# Patient Record
Sex: MALE | Race: WHITE | Employment: PART TIME | URBAN - METROPOLITAN AREA
[De-identification: names, ages, dates, MRNs, and addresses within clinical notes are randomized per-mention and may not be internally consistent; named-entity substitution may affect disease eponyms.]

---

## 2019-05-12 ENCOUNTER — APPOINTMENT (OUTPATIENT)
Dept: GENERAL RADIOLOGY | Age: 29
End: 2019-05-12

## 2019-05-12 ENCOUNTER — HOSPITAL ENCOUNTER (EMERGENCY)
Age: 29
Discharge: HOME | End: 2019-05-12
Attending: EMERGENCY MEDICINE

## 2019-05-12 VITALS
RESPIRATION RATE: 16 BRPM | WEIGHT: 175 LBS | BODY MASS INDEX: 26.52 KG/M2 | HEIGHT: 68 IN | TEMPERATURE: 97.7 F | HEART RATE: 52 BPM | SYSTOLIC BLOOD PRESSURE: 145 MMHG | DIASTOLIC BLOOD PRESSURE: 69 MMHG | OXYGEN SATURATION: 100 %

## 2019-05-12 DIAGNOSIS — Z98.890 STATUS POST REDUCTION MAMMOPLASTY: ICD-10-CM

## 2019-05-12 DIAGNOSIS — S92.901A: Primary | ICD-10-CM

## 2019-05-12 DIAGNOSIS — S93.601A FOOT SPRAIN, RIGHT, INITIAL ENCOUNTER: ICD-10-CM

## 2019-05-12 PROCEDURE — 6360000002 HC RX W HCPCS: Performed by: EMERGENCY MEDICINE

## 2019-05-12 PROCEDURE — 73630 X-RAY EXAM OF FOOT: CPT

## 2019-05-12 PROCEDURE — 99284 EMERGENCY DEPT VISIT MOD MDM: CPT

## 2019-05-12 PROCEDURE — 2500000003 HC RX 250 WO HCPCS: Performed by: EMERGENCY MEDICINE

## 2019-05-12 PROCEDURE — 73610 X-RAY EXAM OF ANKLE: CPT

## 2019-05-12 PROCEDURE — 96374 THER/PROPH/DIAG INJ IV PUSH: CPT

## 2019-05-12 RX ORDER — HYDROCODONE BITARTRATE AND ACETAMINOPHEN 5; 325 MG/1; MG/1
1 TABLET ORAL EVERY 6 HOURS PRN
Qty: 12 TABLET | Refills: 0 | Status: SHIPPED | OUTPATIENT
Start: 2019-05-12 | End: 2019-05-15

## 2019-05-12 RX ORDER — SODIUM CHLORIDE 0.9 % (FLUSH) 0.9 %
10 SYRINGE (ML) INJECTION PRN
Status: DISCONTINUED | OUTPATIENT
Start: 2019-05-12 | End: 2019-05-12 | Stop reason: HOSPADM

## 2019-05-12 RX ORDER — NAPROXEN 500 MG/1
500 TABLET ORAL 2 TIMES DAILY
Qty: 14 TABLET | Refills: 0 | Status: SHIPPED | OUTPATIENT
Start: 2019-05-12 | End: 2019-12-20 | Stop reason: ALTCHOICE

## 2019-05-12 RX ORDER — FENTANYL CITRATE 50 UG/ML
25 INJECTION, SOLUTION INTRAMUSCULAR; INTRAVENOUS ONCE
Status: COMPLETED | OUTPATIENT
Start: 2019-05-12 | End: 2019-05-12

## 2019-05-12 RX ORDER — KETOROLAC TROMETHAMINE 30 MG/ML
30 INJECTION, SOLUTION INTRAMUSCULAR; INTRAVENOUS ONCE
Status: DISCONTINUED | OUTPATIENT
Start: 2019-05-12 | End: 2019-05-12

## 2019-05-12 RX ORDER — KETOROLAC TROMETHAMINE 30 MG/ML
15 INJECTION, SOLUTION INTRAMUSCULAR; INTRAVENOUS ONCE
Status: COMPLETED | OUTPATIENT
Start: 2019-05-12 | End: 2019-05-12

## 2019-05-12 RX ORDER — KETAMINE HYDROCHLORIDE 10 MG/ML
1 INJECTION, SOLUTION INTRAMUSCULAR; INTRAVENOUS ONCE
Status: COMPLETED | OUTPATIENT
Start: 2019-05-12 | End: 2019-05-12

## 2019-05-12 RX ADMIN — FENTANYL CITRATE 25 MCG: 50 INJECTION, SOLUTION INTRAMUSCULAR; INTRAVENOUS at 14:25

## 2019-05-12 RX ADMIN — KETOROLAC TROMETHAMINE 15 MG: 30 INJECTION, SOLUTION INTRAMUSCULAR at 13:49

## 2019-05-12 RX ADMIN — KETAMINE HYDROCHLORIDE 79.4 MG: 10 INJECTION, SOLUTION INTRAMUSCULAR; INTRAVENOUS at 14:26

## 2019-05-12 ASSESSMENT — PAIN - FUNCTIONAL ASSESSMENT: PAIN_FUNCTIONAL_ASSESSMENT: PREVENTS OR INTERFERES SOME ACTIVE ACTIVITIES AND ADLS

## 2019-05-12 ASSESSMENT — PAIN SCALES - GENERAL
PAINLEVEL_OUTOF10: 2
PAINLEVEL_OUTOF10: 6

## 2019-05-12 ASSESSMENT — PAIN DESCRIPTION - PAIN TYPE: TYPE: ACUTE PAIN

## 2019-05-12 ASSESSMENT — ENCOUNTER SYMPTOMS
VOMITING: 0
CONSTIPATION: 0
SHORTNESS OF BREATH: 0
BACK PAIN: 0
WHEEZING: 0
ABDOMINAL PAIN: 0
COUGH: 0
BLOOD IN STOOL: 0
NAUSEA: 0
DIARRHEA: 0

## 2019-05-12 ASSESSMENT — PAIN DESCRIPTION - ONSET: ONSET: ON-GOING

## 2019-05-12 ASSESSMENT — PAIN DESCRIPTION - DESCRIPTORS: DESCRIPTORS: THROBBING;SHARP;NUMBNESS

## 2019-05-12 ASSESSMENT — PAIN DESCRIPTION - FREQUENCY: FREQUENCY: INTERMITTENT

## 2019-05-12 ASSESSMENT — PAIN DESCRIPTION - PROGRESSION: CLINICAL_PROGRESSION: GRADUALLY WORSENING

## 2019-05-12 ASSESSMENT — PAIN DESCRIPTION - ORIENTATION: ORIENTATION: RIGHT

## 2019-05-12 ASSESSMENT — PAIN DESCRIPTION - LOCATION: LOCATION: ANKLE

## 2019-05-12 NOTE — ED PROVIDER NOTES
41-year-old male presents to the ED with chief complaint of right ankle pain after falling off a horse about 10 minutes prior to arrival. States he is in severe pain and cannot put pressure on it. Says it hurts even to touch and cannot move it due to severe pain. Ankle Problem   Location:  Ankle and foot  Time since incident:  10 minutes  Injury: yes    Mechanism of injury: fall    Fall:     Fall occurred: off horse. Impact surface:  Dirt    Point of impact:  Feet    Entrapped after fall: no    Ankle location:  R ankle  Foot location:  R foot  Pain details:     Quality:  Aching    Radiates to:  Does not radiate    Severity:  Severe    Onset quality:  Sudden    Timing:  Constant    Progression:  Unchanged  Chronicity:  New  Dislocation: yes    Foreign body present:  No foreign bodies  Tetanus status:  Unknown  Prior injury to area:  Unable to specify  Relieved by:  Nothing  Worsened by:  Bearing weight, flexion, rotation, extension, activity, abduction and adduction  Ineffective treatments:  None tried  Associated symptoms: decreased ROM, stiffness and swelling    Associated symptoms: no back pain, no fatigue, no fever, no itching, no muscle weakness, no neck pain, no numbness and no tingling    Risk factors: no concern for non-accidental trauma, no frequent fractures, no known bone disorder and no recent illness        Review of Systems   Constitutional: Negative for chills, fatigue and fever. Respiratory: Negative for cough, shortness of breath and wheezing. Cardiovascular: Negative for chest pain. Gastrointestinal: Negative for abdominal pain, blood in stool, constipation, diarrhea, nausea and vomiting. Genitourinary: Negative for dysuria and frequency. Musculoskeletal: Positive for stiffness. Negative for back pain and neck pain. Right foot and ankle pain   Skin: Negative for itching, rash and wound.    Neurological: Negative for dizziness, weakness, light-headedness, numbness and headaches. All other systems reviewed and are negative. Physical Exam   Constitutional: He appears well-developed and well-nourished. No distress. HENT:   Head: Normocephalic and atraumatic. Eyes: Pupils are equal, round, and reactive to light. Neck: Normal range of motion. Neck supple. Cardiovascular: Normal rate, regular rhythm, normal heart sounds and intact distal pulses. No murmur heard. Pulmonary/Chest: Effort normal and breath sounds normal. No respiratory distress. He has no wheezes. He has no rales. Abdominal: Soft. Bowel sounds are normal. There is no tenderness. There is no rebound and no guarding. Musculoskeletal: He exhibits tenderness (r ankle TTP, can not move due to severe pain, 2+ distal pulses, sensation intact, cap refill <2s) and deformity. He exhibits no edema. Neurological: He is alert. Skin: Skin is warm and dry. Capillary refill takes less than 2 seconds. He is not diaphoretic. Nursing note and vitals reviewed. Procedures  Conscious Sedation Procedure Note    Indication: dislocation reduction    Consent: I have discussed with the patient and/or the patient representative the indication, alternatives, and the possible risks and/or complications of the planned procedure and the anesthesia methods. The patient and/or patient representative appear to understand and agree to proceed. Physician Involvement: The attending physician was present and supervising this procedure. Pre-Sedation Documentation and Exam:  Time: 8639  I have personally completed a history, physical exam & review of systems for this patient (see notes). Vital signs have been reviewed (see flow sheet for vitals). I have reviewed the patient's history and review of systems.     Airway Assessment: Mallampati Class III - (soft palate & base of uvula are visible)    Prior History of Anesthesia Complications: none    ASA Classification: Class 1 - A normal healthy patient    Sedation/ Anesthesia Plan: intravenous sedation    Medications Used: fentanyl 25 mcg intravenously and ketamine 80 mg intravenously    Monitoring and Safety: The patient was placed on a cardiac monitor and vital signs, pulse oximetry and level of consciousness were continuously evaluated throughout the procedure. The patient was closely monitored until recovery from the medications was complete and the patient had returned to baseline status. Respiratory therapy was on standby at all times during the procedure. (The following sections must be completed)  Post-Sedation Vital Signs: Vital signs were reviewed and were stable after the procedure (see flow sheet for vitals)            Post-Sedation Exam:   Time: 5334. Lungs: clear to auscultation bilaterally without crackles or wheezing and Cardiovascular: regular rate and rhythm           Complications: none    PROCEDURE NOTE    5/12/19       Time: 0017    JOINT  REDUCTION  PROCEDURE  Risks, benefits and alternatives (for applicable procedures below) described. Performed By: Giovanny Flores DO. Indication:   Joint dislocation and fracture  Informed consent: The patient was counseled regarding the procedure, it's indications, risks, potential complications and alternatives and any questions were answered. Consent was obtained. .  Location:   right  Talonavicular joint  Procedure:  Anesthsetic/anesthsia was obtained using conscious sedation -SEE CONSCIOUS SEDATION NOTE FOR DETAILS. Attempted reduction was performed by direct traction. Patient tolerated the procedure well. Post-reduction XR's:  were obtained and revealed satisfactory reduction. Orthopaedic Consultation:  No.         --------------------------------------------- PAST HISTORY ---------------------------------------------  Past Medical History:  has a past medical history of Asthma.     Past Surgical History:  has a past surgical history that includes Elbow surgery; shoulder surgery; and Foot surgery for immediate return here for re evaluation. They will followup with their primary care physician and orthopedic physician by calling their office on Monday.      --------------------------------- ADDITIONAL PROVIDER NOTES ---------------------------------  At this time the patient is without objective evidence of an acute process requiring hospitalization or inpatient management. They have remained hemodynamically stable throughout their entire ED visit and are stable for discharge with outpatient follow-up. The plan has been discussed in detail and they are aware of the specific conditions for emergent return, as well as the importance of follow-up. New Prescriptions    HYDROCODONE-ACETAMINOPHEN (NORCO) 5-325 MG PER TABLET    Take 1 tablet by mouth every 6 hours as needed for Pain for up to 3 days. NAPROXEN (NAPROSYN) 500 MG TABLET    Take 1 tablet by mouth 2 times daily for 7 days       Diagnosis:  1. Closed fracture dislocation of right foot, initial encounter    2. Foot sprain, right, initial encounter         Disposition:  Patient's disposition: Discharge to home  Patient's condition is stable. [facility]    ED Course as of May 12 1511   Sun May 12, 2019   1510 Patient still navicular joint was reduced. Punctate fracture of the dorsal navicular bone anton seen on x-ray which most likely indicates ligamentous injury. Put in 3 sided splint after consciously sedated and reduced. Discharged with pain medicine. Given referral for orthopedic trauma. Post reduction and splint placement neurovascularly intact. The patient is nontoxic appearing and stable for outpatient treatment and management. They were instructed to follow-up with their primary care physician and were given warning signs to return to the ED. All of their questions were answered at this time and are agreeable with discharge and early follow up.     [BM]      ED Course User Index  [BM] DO Leigh Lin, DO  Resident  05/12/19 [id number]

## 2019-05-12 NOTE — ED NOTES
Patient is alert and oriented x 4. Pt is feeling better. VS stable at this time. Waiting for radiology report.       YAMILET Rojo RN  05/12/19 [id number]

## 2019-05-13 ENCOUNTER — TELEPHONE (OUTPATIENT)
Dept: ADMINISTRATIVE | Age: 29
End: 2019-05-13

## 2019-05-15 NOTE — TELEPHONE ENCOUNTER
Spoke with pt re ed f/u and pt stated he saw another ortho dr on 5/14 and no longer needs to be seen in our office--let him know if there is anything we can do for him to call at any time

## 2019-12-20 ENCOUNTER — HOSPITAL ENCOUNTER (EMERGENCY)
Age: 29
Discharge: HOME | End: 2019-12-20
Attending: NURSE PRACTITIONER

## 2019-12-20 VITALS
WEIGHT: 175 LBS | HEART RATE: 83 BPM | DIASTOLIC BLOOD PRESSURE: 83 MMHG | BODY MASS INDEX: 26.52 KG/M2 | HEIGHT: 68 IN | SYSTOLIC BLOOD PRESSURE: 126 MMHG | TEMPERATURE: 98.2 F | OXYGEN SATURATION: 98 % | RESPIRATION RATE: 18 BRPM

## 2019-12-20 DIAGNOSIS — R60.0 LIP EDEMA: Primary | ICD-10-CM

## 2019-12-20 DIAGNOSIS — J45.901 EXACERBATION OF ASTHMA, UNSPECIFIED ASTHMA SEVERITY, UNSPECIFIED WHETHER PERSISTENT: ICD-10-CM

## 2019-12-20 PROCEDURE — 99212 OFFICE O/P EST SF 10 MIN: CPT

## 2019-12-20 RX ORDER — IBUPROFEN 200 MG
800 TABLET ORAL EVERY 6 HOURS PRN
COMMUNITY

## 2019-12-20 RX ORDER — DIPHENHYDRAMINE HCL 25 MG
50 TABLET ORAL EVERY 6 HOURS PRN
COMMUNITY

## 2019-12-20 RX ORDER — METHYLPREDNISOLONE 4 MG/1
TABLET ORAL
Qty: 21 TABLET | Status: SHIPPED | OUTPATIENT
Start: 2019-12-20 | End: 2019-12-26

## 2019-12-29 ENCOUNTER — HOSPITAL ENCOUNTER (EMERGENCY)
Age: 29
Discharge: HOME | End: 2019-12-29

## 2019-12-29 VITALS
HEIGHT: 67 IN | RESPIRATION RATE: 12 BRPM | WEIGHT: 175 LBS | DIASTOLIC BLOOD PRESSURE: 73 MMHG | HEART RATE: 80 BPM | OXYGEN SATURATION: 98 % | TEMPERATURE: 98 F | BODY MASS INDEX: 27.47 KG/M2 | SYSTOLIC BLOOD PRESSURE: 130 MMHG

## 2019-12-29 DIAGNOSIS — R09.82 POST-NASAL DISCHARGE: ICD-10-CM

## 2019-12-29 DIAGNOSIS — R09.89 GLOBUS SENSATION: Primary | ICD-10-CM

## 2019-12-29 PROCEDURE — 99282 EMERGENCY DEPT VISIT SF MDM: CPT

## 2019-12-29 RX ORDER — AMOXICILLIN 500 MG/1
500 CAPSULE ORAL 3 TIMES DAILY
Qty: 30 CAPSULE | Refills: 0 | Status: SHIPPED | OUTPATIENT
Start: 2019-12-29 | End: 2020-01-08

## 2023-03-22 PROBLEM — F41.9 ANXIETY: Status: ACTIVE | Noted: 2023-03-22

## 2023-03-22 PROBLEM — S61.012S THUMB LACERATION, LEFT, SEQUELA: Status: ACTIVE | Noted: 2023-03-22

## 2023-03-22 PROBLEM — G47.00 INSOMNIA: Status: ACTIVE | Noted: 2023-03-22

## 2023-03-22 PROBLEM — J45.40 MODERATE PERSISTENT ASTHMA WITHOUT COMPLICATION (HHS-HCC): Status: ACTIVE | Noted: 2023-03-22

## 2023-03-22 PROBLEM — M25.519 SHOULDER PAIN: Status: ACTIVE | Noted: 2023-03-22

## 2023-03-22 PROBLEM — J32.9 SINUSITIS: Status: ACTIVE | Noted: 2023-03-22

## 2023-03-22 PROBLEM — H69.90 EUSTACHIAN TUBE DYSFUNCTION: Status: ACTIVE | Noted: 2023-03-22

## 2023-03-22 RX ORDER — TRAZODONE HYDROCHLORIDE 50 MG/1
1-2 TABLET ORAL NIGHTLY PRN
COMMUNITY
Start: 2022-03-16 | End: 2023-03-23 | Stop reason: ALTCHOICE

## 2023-03-22 RX ORDER — FLUTICASONE PROPIONATE 50 MCG
2 SPRAY, SUSPENSION (ML) NASAL DAILY
COMMUNITY
Start: 2020-01-10 | End: 2023-03-23 | Stop reason: ALTCHOICE

## 2023-03-22 RX ORDER — ALBUTEROL SULFATE 90 UG/1
AEROSOL, METERED RESPIRATORY (INHALATION)
COMMUNITY
Start: 2020-01-10

## 2023-03-22 RX ORDER — DOXYCYCLINE 100 MG/1
1 CAPSULE ORAL EVERY 12 HOURS
COMMUNITY
Start: 2022-04-08 | End: 2023-03-23 | Stop reason: ALTCHOICE

## 2023-03-22 RX ORDER — PREDNISONE 20 MG/1
2 TABLET ORAL DAILY
COMMUNITY
Start: 2022-04-08 | End: 2023-03-23 | Stop reason: ALTCHOICE

## 2023-03-22 RX ORDER — BUDESONIDE AND FORMOTEROL FUMARATE DIHYDRATE 160; 4.5 UG/1; UG/1
2 AEROSOL RESPIRATORY (INHALATION)
COMMUNITY
Start: 2020-01-10

## 2023-03-22 RX ORDER — ESCITALOPRAM OXALATE 20 MG/1
1 TABLET ORAL DAILY
COMMUNITY
Start: 2020-01-10 | End: 2023-03-23 | Stop reason: ALTCHOICE

## 2023-03-23 ENCOUNTER — OFFICE VISIT (OUTPATIENT)
Dept: PRIMARY CARE | Facility: CLINIC | Age: 33
End: 2023-03-23
Payer: COMMERCIAL

## 2023-03-23 VITALS
SYSTOLIC BLOOD PRESSURE: 130 MMHG | WEIGHT: 198 LBS | DIASTOLIC BLOOD PRESSURE: 79 MMHG | OXYGEN SATURATION: 97 % | HEART RATE: 98 BPM | BODY MASS INDEX: 31.01 KG/M2

## 2023-03-23 DIAGNOSIS — R10.13 EPIGASTRIC PAIN: Primary | ICD-10-CM

## 2023-03-23 PROCEDURE — 99213 OFFICE O/P EST LOW 20 MIN: CPT | Performed by: INTERNAL MEDICINE

## 2023-03-23 RX ORDER — OMEPRAZOLE 40 MG/1
40 CAPSULE, DELAYED RELEASE ORAL
Qty: 30 CAPSULE | Refills: 2 | Status: SHIPPED | OUTPATIENT
Start: 2023-03-23 | End: 2024-03-14 | Stop reason: ALTCHOICE

## 2023-03-23 ASSESSMENT — PATIENT HEALTH QUESTIONNAIRE - PHQ9
SUM OF ALL RESPONSES TO PHQ9 QUESTIONS 1 AND 2: 0
2. FEELING DOWN, DEPRESSED OR HOPELESS: NOT AT ALL
1. LITTLE INTEREST OR PLEASURE IN DOING THINGS: NOT AT ALL

## 2023-03-23 NOTE — PROGRESS NOTES
Subjective   Patient ID: Ishmael Odell is a 32 y.o. male who presents for Abdominal Pain.    HPI   Reports intermittent abd pain x several weeks. Pain worse after eating and worse with fatty food. Pain in epigastric region and RUQ . No GERD. Bowels are regular. No NV.  Ibuoprofen has not helpful.    Review of Systems   All other systems reviewed and are negative.      Objective   /79   Pulse 98   Wt 89.8 kg (198 lb)   SpO2 97%   BMI 31.01 kg/m²     Physical Exam  Constitutional:       Appearance: Normal appearance.   HENT:      Head: Normocephalic and atraumatic.   Cardiovascular:      Rate and Rhythm: Normal rate and regular rhythm.      Heart sounds: Normal heart sounds. No murmur heard.     No gallop.   Pulmonary:      Effort: Pulmonary effort is normal. No respiratory distress.      Breath sounds: No wheezing or rales.   Abdominal:      General: Abdomen is flat. Bowel sounds are normal.      Palpations: Abdomen is soft.      Tenderness: There is abdominal tenderness.   Skin:     General: Skin is warm and dry.      Findings: No erythema.   Neurological:      General: No focal deficit present.      Mental Status: He is alert and oriented to person, place, and time. Mental status is at baseline.   Psychiatric:         Mood and Affect: Mood normal.         Behavior: Behavior normal.         Assessment/Plan     #Abdominal pain   -Rx omeprazole 40mg daily and order RUQ US

## 2023-10-26 ENCOUNTER — TELEMEDICINE (OUTPATIENT)
Dept: BEHAVIORAL HEALTH | Facility: CLINIC | Age: 33
End: 2023-10-26
Payer: COMMERCIAL

## 2023-10-26 DIAGNOSIS — F90.0 ATTENTION DEFICIT HYPERACTIVITY DISORDER (ADHD), PREDOMINANTLY INATTENTIVE TYPE: ICD-10-CM

## 2023-10-26 PROCEDURE — 99214 OFFICE O/P EST MOD 30 MIN: CPT

## 2023-10-26 RX ORDER — DEXTROAMPHETAMINE SACCHARATE, AMPHETAMINE ASPARTATE, DEXTROAMPHETAMINE SULFATE AND AMPHETAMINE SULFATE 5; 5; 5; 5 MG/1; MG/1; MG/1; MG/1
20 TABLET ORAL 2 TIMES DAILY
Qty: 60 TABLET | Refills: 0 | Status: SHIPPED | OUTPATIENT
Start: 2023-10-26 | End: 2023-11-27 | Stop reason: SDUPTHER

## 2023-10-26 NOTE — PROGRESS NOTES
"Subjective   Patient ID: Ishmael Odell is a 33 y.o. male with  past medical history of asthma, insomnia, ADHD and anxiety presenting to outpatient psychiatric for psychiatric evaluation and treatment for   anxiety ADHD     Chief Compliant - Follow up medication assessment     Patient provided 2 personal identifiers prior to virtual teleconferencing appointment    Patient describes his typical day and states that he starts his day at 4 am and finishes work at noon. He feels that he is initially productive but he notices that by 4 pm, he begins to lose focused and \"becomes crabby.\"     He feels that he is tolerating Adderall at the ER dose well and is hesitant to make changes.     Patient denies being depressed and he has no thoughts of SI or HI .  He identifies himself as a worrier but he hasn't experienced panic attacks.     Sleep is \"OK\" and his appetite is described as fair.     ADHD  This is a chronic problem. The current episode started more than 1 year ago. The problem occurs constantly. The problem has been gradually improving. Associated symptoms include abdominal pain and vomiting. Pertinent negatives include no arthralgias, chest pain, chills, coughing, fatigue, headaches, joint swelling, myalgias, nausea, neck pain, numbness or weakness. Treatments tried: Medication - Adderall. The treatment provided mild relief.   Anxiety  Presents for follow-up visit. Symptoms include decreased concentration, excessive worry and irritability. Patient reports no chest pain, compulsions, confusion, depressed mood, dizziness, feeling of choking, hyperventilation, insomnia, muscle tension, nausea, nervous/anxious behavior, obsessions, palpitations, panic, restlessness, shortness of breath or suicidal ideas. Symptoms occur most days. The severity of symptoms is mild. The quality of sleep is fair. Nighttime awakenings: occasional.     Compliance with medications is %.     Review of Systems   Constitutional:  Positive " for irritability. Negative for activity change, appetite change, chills and fatigue.   HENT: Negative.     Eyes: Negative.    Respiratory:  Negative for apnea, cough, choking, chest tightness, shortness of breath and stridor.    Cardiovascular:  Negative for chest pain and palpitations.   Gastrointestinal:  Positive for abdominal pain, constipation and vomiting. Negative for abdominal distention, diarrhea and nausea.        Reflux    Endocrine: Negative.    Genitourinary: Negative.    Musculoskeletal:  Negative for arthralgias, back pain, gait problem, joint swelling, myalgias, neck pain and neck stiffness.   Skin:  Negative for pallor.   Allergic/Immunologic: Negative.    Neurological:  Negative for dizziness, tremors, seizures, syncope, facial asymmetry, speech difficulty, weakness, light-headedness, numbness and headaches.   Hematological: Negative.    Psychiatric/Behavioral:  Positive for decreased concentration. Negative for agitation, behavioral problems, confusion, dysphoric mood, hallucinations, self-injury, sleep disturbance and suicidal ideas. The patient is not nervous/anxious, does not have insomnia and is not hyperactive.      Objective     Physical Exam is limited since visit is virtual      Physical Exam  Constitutional:       Appearance: Normal appearance. He is normal weight.   HENT:      Head: Normocephalic and atraumatic.   Pulmonary:      Effort: Pulmonary effort is normal. No respiratory distress.   Skin:     Coloration: Skin is not jaundiced or pale.      Findings: No erythema.   Neurological:      Mental Status: He is alert and oriented to person, place, and time.   Psychiatric:         Attention and Perception: Attention and perception normal.         Mood and Affect: Mood and affect normal.         Speech: Speech normal.         Behavior: Behavior normal. Behavior is cooperative.         Thought Content: Thought content normal.         Cognition and Memory: Cognition and memory normal.          Judgment: Judgment normal.      Comments: Mental Status Exam and suicide assessment are documented in screening section      Lab Review:   No visits with results within 6 Month(s) from this visit.   Latest known visit with results is:   [facility] Encounter on 05/12/2021   Component Date Value    Cholesterol 05/12/2021 250 (H)     HDL 05/12/2021 32.1 (A)     Cholesterol/HDL Ratio 05/12/2021 7.8 (A)     LDL 05/12/2021 177 (H)     VLDL 05/12/2021 41 (H)     Triglycerides 05/12/2021 204 (H)     Non HDL Cholesterol 05/12/2021 218     WBC 05/12/2021 3.5 (L)     RBC 05/12/2021 5.89     Hemoglobin 05/12/2021 15.9     Hematocrit 05/12/2021 48.8     MCV 05/12/2021 83     MCHC 05/12/2021 32.6     Platelets 05/12/2021 238     RDW 05/12/2021 13.3     Neutrophils % 05/12/2021 49.1     Immature Granulocytes %,* 05/12/2021 0.0     Lymphocytes % 05/12/2021 31.4     Monocytes % 05/12/2021 10.6     Eosinophils % 05/12/2021 8.3     Basophils % 05/12/2021 0.6     Neutrophils Absolute 05/12/2021 1.72     Lymphocytes Absolute 05/12/2021 1.10 (L)     Monocytes Absolute 05/12/2021 0.37     Eosinophils Absolute 05/12/2021 0.29     Basophils Absolute 05/12/2021 0.02     Glucose 05/12/2021 68 (L)     Sodium 05/12/2021 137     Potassium 05/12/2021 4.9     Chloride 05/12/2021 97 (L)     Bicarbonate 05/12/2021 26     Anion Gap 05/12/2021 19     Urea Nitrogen 05/12/2021 16     Creatinine 05/12/2021 1.21     GLOMERULAR FILTRATION RA* 05/12/2021 >60     GLOMERULAR FILTRATION RA* 05/12/2021 >60     Calcium 05/12/2021 9.9     Albumin 05/12/2021 5.1 (H)     Alkaline Phosphatase 05/12/2021 56     Total Protein 05/12/2021 7.0     AST 05/12/2021 24     Total Bilirubin 05/12/2021 0.5     ALT (SGPT) 05/12/2021 26        Assessment/Plan   Safety Assessment: no current or past SI, no SA, (+) guns. RF include age,male gender, anxiety . PF include connected family,, future focused, hopeful. low risk    [facility] accessed with no concerns. Will order urine drug  screen today  Diagnoses and all orders for this visit: Patient tolerated initial dose of Adderall but effects are wearing off as day progresses. Will change formulation to IR and reassess in one month for effectiveness.   Attention deficit hyperactivity disorder (ADHD), predominantly inattentive type  -     amphetamine-dextroamphetamine (AdderalL) 20 mg tablet; Take 1 tablet (20 mg) by mouth 2 times a day.    -     Drug Screen, Urine With Reflex to Confirmation;     Future  -     Follow Up In Psychiatry; 4 - 6 weeks.

## 2023-11-04 ASSESSMENT — ENCOUNTER SYMPTOMS
APPETITE CHANGE: 0
SPEECH DIFFICULTY: 0
THOUGHT CONTENT - OBSESSIONS: 0
FEELING OF CHOKING: 0
HYPERACTIVE: 0
PANIC: 0
AGITATION: 0
DYSPHORIC MOOD: 0
VOMITING: 1
CONSTIPATION: 1
DIARRHEA: 0
COUGH: 0
ACTIVITY CHANGE: 0
STRIDOR: 0
SLEEP DISTURBANCE: 0
ALLERGIC/IMMUNOLOGIC NEGATIVE: 1
HEADACHES: 0
ROS GI COMMENTS: REFLUX
LIGHT-HEADEDNESS: 0
DECREASED CONCENTRATION: 1
WEAKNESS: 0
CHEST TIGHTNESS: 0
MUSCLE TENSION: 0
SHORTNESS OF BREATH: 0
NAUSEA: 0
NECK STIFFNESS: 0
RESTLESSNESS: 0
CHILLS: 0
CHOKING: 0
ARTHRALGIAS: 0
ABDOMINAL PAIN: 1
ENDOCRINE NEGATIVE: 1
IRRITABILITY: 1
BACK PAIN: 0
DEPRESSED MOOD: 0
FACIAL ASYMMETRY: 0
FATIGUE: 0
COMPULSIONS: 0
PALPITATIONS: 0
CONFUSION: 0
TREMORS: 0
APNEA: 0
NUMBNESS: 0
ABDOMINAL DISTENTION: 0
MYALGIAS: 0
EYES NEGATIVE: 1
DIZZINESS: 0
INSOMNIA: 0
JOINT SWELLING: 0
HYPERVENTILATION: 0
HEMATOLOGIC/LYMPHATIC NEGATIVE: 1
SEIZURES: 0
HALLUCINATIONS: 0
NERVOUS/ANXIOUS: 0
NECK PAIN: 0

## 2023-11-04 ASSESSMENT — COLUMBIA-SUICIDE SEVERITY RATING SCALE - C-SSRS
TOTAL  NUMBER OF ABORTED OR SELF INTERRUPTED ATTEMPTS LIFETIME: NO
2. HAVE YOU ACTUALLY HAD ANY THOUGHTS OF KILLING YOURSELF?: NO
1. HAVE YOU WISHED YOU WERE DEAD OR WISHED YOU COULD GO TO SLEEP AND NOT WAKE UP?: NO
6. HAVE YOU EVER DONE ANYTHING, STARTED TO DO ANYTHING, OR PREPARED TO DO ANYTHING TO END YOUR LIFE?: NO
ATTEMPT LIFETIME: NO
TOTAL  NUMBER OF INTERRUPTED ATTEMPTS LIFETIME: NO

## 2023-11-04 NOTE — PATIENT INSTRUCTIONS
1. Reviewed diagnostic impression including subjective and objective data and provided education about anxiety disorder and ADHD, etiology, treatment recommendations including medication, therapy, course of treatment and prognosis. Patient amendable to treatment plan.    2. Recommendations  START: 20 mg Adderall IR - take one tablet , twice daily for ADHD     3. Reviewed r/b/a, possible side effects of the medication(s). Client is aware benefit/risks     4. Labs reviewed and discussed - Will order lab work and urine drug screen at next visit.     5. Plan for supportive psychotherapy    Follow up with physical health providers as scheduled    May follow up sooner if experiences worsening symptoms by calling  Psychiatry at [phone]     Patient verbalized an understanding to call Mobile Crisis at [phone] ([facility]), [phone], or [phone]/go to the nearest emergency room if experiences thoughts of harm to self or others.

## 2023-11-27 ENCOUNTER — TELEMEDICINE (OUTPATIENT)
Dept: BEHAVIORAL HEALTH | Facility: CLINIC | Age: 33
End: 2023-11-27
Payer: COMMERCIAL

## 2023-11-27 DIAGNOSIS — F90.0 ATTENTION DEFICIT HYPERACTIVITY DISORDER (ADHD), PREDOMINANTLY INATTENTIVE TYPE: ICD-10-CM

## 2023-11-27 PROCEDURE — 99213 OFFICE O/P EST LOW 20 MIN: CPT

## 2023-11-27 RX ORDER — DEXTROAMPHETAMINE SACCHARATE, AMPHETAMINE ASPARTATE, DEXTROAMPHETAMINE SULFATE AND AMPHETAMINE SULFATE 5; 5; 5; 5 MG/1; MG/1; MG/1; MG/1
20 TABLET ORAL 2 TIMES DAILY
Qty: 60 TABLET | Refills: 0 | Status: SHIPPED | OUTPATIENT
Start: 2023-11-27 | End: 2024-01-11 | Stop reason: SDUPTHER

## 2023-11-27 NOTE — PROGRESS NOTES
Subjective   Patient ID: Ishmael Odell is a 33 y.o. male with  past medical history of asthma, insomnia, ADHD and anxiety presenting to outpatient psychiatric for psychiatric evaluation and treatment for   anxiety ADHD     Chief Compliant - Follow up medication assessment     Patient provided 2 personal identifiers prior to virtual teleconferencing appointment    Had Covid-19 last 10 days. He is doing better with symptoms. Joint pain and fatigue. Everyone in the home is well.     Patient is busy at work but finds time to enjoy himself outside of work. Went to a rodeo on the weekend.     Patient is satisfied with stimulant therapy and describes improved concentration, improved ability to focus, and he describes less distractibility.     No other mental health or physical complaints.     Patient denies symptoms of pain, depressed mood, or anxiety. Patient does not endorse SI/HI or panic attacks.       ADHD  This is a chronic problem. The current episode started more than 1 year ago. The problem occurs constantly. The problem has been gradually improving. Associated symptoms include abdominal pain and vomiting. Pertinent negatives include no arthralgias, chest pain, chills, coughing, fatigue, headaches, joint swelling, myalgias, nausea, neck pain, numbness or weakness. Treatments tried: Medication - Adderall. The treatment provided mild relief.   Anxiety  Presents for follow-up visit. Symptoms include decreased concentration, excessive worry and irritability. Patient reports no chest pain, compulsions, confusion, depressed mood, dizziness, feeling of choking, hyperventilation, insomnia, muscle tension, nausea, nervous/anxious behavior, obsessions, palpitations, panic, restlessness, shortness of breath or suicidal ideas. Symptoms occur most days. The severity of symptoms is mild. The quality of sleep is fair. Nighttime awakenings: occasional.     Compliance with medications is %.     Review of Systems    Constitutional:  Positive for irritability. Negative for activity change, appetite change, chills and fatigue.   HENT: Negative.     Eyes: Negative.    Respiratory:  Negative for apnea, cough, choking, chest tightness, shortness of breath and stridor.    Cardiovascular:  Negative for chest pain and palpitations.   Gastrointestinal:  Positive for abdominal pain, constipation and vomiting. Negative for abdominal distention, diarrhea and nausea.        Reflux    Endocrine: Negative.    Genitourinary: Negative.    Musculoskeletal:  Negative for arthralgias, back pain, gait problem, joint swelling, myalgias, neck pain and neck stiffness.   Skin:  Negative for pallor.   Allergic/Immunologic: Negative.    Neurological:  Negative for dizziness, tremors, seizures, syncope, facial asymmetry, speech difficulty, weakness, light-headedness, numbness and headaches.   Hematological: Negative.    Psychiatric/Behavioral:  Positive for decreased concentration. Negative for agitation, behavioral problems, confusion, dysphoric mood, hallucinations, self-injury, sleep disturbance and suicidal ideas. The patient is not nervous/anxious, does not have insomnia and is not hyperactive.      Lab Review:   No visits with results within 6 Month(s) from this visit.   Latest known visit with results is:   [facility] Encounter on 05/12/2021   Component Date Value    Cholesterol 05/12/2021 250 (H)     HDL 05/12/2021 32.1 (A)     Cholesterol/HDL Ratio 05/12/2021 7.8 (A)     LDL 05/12/2021 177 (H)     VLDL 05/12/2021 41 (H)     Triglycerides 05/12/2021 204 (H)     Non HDL Cholesterol 05/12/2021 218     WBC 05/12/2021 3.5 (L)     RBC 05/12/2021 5.89     Hemoglobin 05/12/2021 15.9     Hematocrit 05/12/2021 48.8     MCV 05/12/2021 83     MCHC 05/12/2021 32.6     Platelets 05/12/2021 238     RDW 05/12/2021 13.3     Neutrophils % 05/12/2021 49.1     Immature Granulocytes %,* 05/12/2021 0.0     Lymphocytes % 05/12/2021 31.4     Monocytes % 05/12/2021 10.6   "   Eosinophils % 05/12/2021 8.3     Basophils % 05/12/2021 0.6     Neutrophils Absolute 05/12/2021 1.72     Lymphocytes Absolute 05/12/2021 1.10 (L)     Monocytes Absolute 05/12/2021 0.37     Eosinophils Absolute 05/12/2021 0.29     Basophils Absolute 05/12/2021 0.02     Glucose 05/12/2021 68 (L)     Sodium 05/12/2021 137     Potassium 05/12/2021 4.9     Chloride 05/12/2021 97 (L)     Bicarbonate 05/12/2021 26     Anion Gap 05/12/2021 19     Urea Nitrogen 05/12/2021 16     Creatinine 05/12/2021 1.21     GLOMERULAR FILTRATION RA* 05/12/2021 >60     GLOMERULAR FILTRATION RA* 05/12/2021 >60     Calcium 05/12/2021 9.9     Albumin 05/12/2021 5.1 (H)     Alkaline Phosphatase 05/12/2021 56     Total Protein 05/12/2021 7.0     AST 05/12/2021 24     Total Bilirubin 05/12/2021 0.5     ALT (SGPT) 05/12/2021 26        Assessment/Plan   Safety Assessment: no current or past SI, no SA, (+) guns. RF include age,male gender, anxiety . PF include connected family,, future focused, hopeful. low risk    [facility] accessed with no concerns. Awaiting urine drug screen.   Diagnoses and all orders for this visit:     Attention deficit hyperactivity disorder (ADHD), predominantly inattentive type  -     amphetamine-dextroamphetamine (AdderalL) 20 mg tablet; Take 1 tablet (20 mg) by mouth 2 times a day.    -     Drug Screen, Urine With Reflex to Confirmation;     Future  -     Follow Up In Psychiatry; 4 - 6 weeks.                                Patient describes his typical day and states that he starts his day at 4 am and finishes work at noon. He feels that he is initially productive but he notices that by 4 pm, he begins to lose focused and \"becomes crabby.\"     He feels that he is tolerating Adderall at the ER dose well and is hesitant to make changes.     Patient denies being depressed and he has no thoughts of SI or HI .  He identifies himself as a worrier but he hasn't experienced panic attacks.     Sleep is \"OK\" and his appetite is " described as fair.     ADHD  This is a chronic problem. The current episode started more than 1 year ago. The problem occurs constantly. The problem has been gradually improving. Associated symptoms include abdominal pain and vomiting. Pertinent negatives include no arthralgias, chest pain, chills, coughing, fatigue, headaches, joint swelling, myalgias, nausea, neck pain, numbness or weakness. Treatments tried: Medication - Adderall. The treatment provided mild relief.   Anxiety  Presents for follow-up visit. Symptoms include decreased concentration, excessive worry and irritability. Patient reports no chest pain, compulsions, confusion, depressed mood, dizziness, feeling of choking, hyperventilation, insomnia, muscle tension, nausea, nervous/anxious behavior, obsessions, palpitations, panic, restlessness, shortness of breath or suicidal ideas. Symptoms occur most days. The severity of symptoms is mild. The quality of sleep is fair. Nighttime awakenings: occasional.     Compliance with medications is %.     Review of Systems   Constitutional:  Positive for irritability. Negative for activity change, appetite change, chills and fatigue.   HENT: Negative.     Eyes: Negative.    Respiratory:  Negative for apnea, cough, choking, chest tightness, shortness of breath and stridor.    Cardiovascular:  Negative for chest pain and palpitations.   Gastrointestinal:  Positive for abdominal pain, constipation and vomiting. Negative for abdominal distention, diarrhea and nausea.        Reflux    Endocrine: Negative.    Genitourinary: Negative.    Musculoskeletal:  Negative for arthralgias, back pain, gait problem, joint swelling, myalgias, neck pain and neck stiffness.   Skin:  Negative for pallor.   Allergic/Immunologic: Negative.    Neurological:  Negative for dizziness, tremors, seizures, syncope, facial asymmetry, speech difficulty, weakness, light-headedness, numbness and headaches.   Hematological: Negative.     Psychiatric/Behavioral:  Positive for decreased concentration. Negative for agitation, behavioral problems, confusion, dysphoric mood, hallucinations, self-injury, sleep disturbance and suicidal ideas. The patient is not nervous/anxious, does not have insomnia and is not hyperactive.      Mental Status Exam    General: Appropriately groomed and dressed   Appearance: Appears stated age   Attitude: Calm, cooperative   Behavior: Appropriate eye contact.   Motor Activity: No agitation or retardation. No EPS/TD. Gait not assessed - virtual   Speech: Regular rate, rhythm, volume and tone, spontaneous, fluent.   Mood: Euthymic   Affect: Appropriate with full range   Thought Process: Organized, linear, goal directed. Associations are logical.   Thought Content: Does not endorse suicidal or homicidal ideation, no delusions elicited.   Thought Perception: Does not endorse auditory or visual hallucinations, does not appear to be responding to hallucinatory stimuli.   Cognition: Alert, oriented x3. No deficits noted. Adequate fund of knowledge. No deficit in recent and remote memory. No deficits in attention, concentration or language.    Insight: Good, as patient recognizes symptoms of illness and need for recommended treatments.   Judgment: Can make reasonable decisions about ordinary activities of daily living and necessary medical care recommendations.        Lab Review:   No visits with results within 6 Month(s) from this visit.   Latest known visit with results is:   [facility] Encounter on 05/12/2021   Component Date Value    Cholesterol 05/12/2021 250 (H)     HDL 05/12/2021 32.1 (A)     Cholesterol/HDL Ratio 05/12/2021 7.8 (A)     LDL 05/12/2021 177 (H)     VLDL 05/12/2021 41 (H)     Triglycerides 05/12/2021 204 (H)     Non HDL Cholesterol 05/12/2021 218     WBC 05/12/2021 3.5 (L)     RBC 05/12/2021 5.89     Hemoglobin 05/12/2021 15.9     Hematocrit 05/12/2021 48.8     MCV 05/12/2021 83     MCHC 05/12/2021 32.6      Platelets 05/12/2021 238     RDW 05/12/2021 13.3     Neutrophils % 05/12/2021 49.1     Immature Granulocytes %,* 05/12/2021 0.0     Lymphocytes % 05/12/2021 31.4     Monocytes % 05/12/2021 10.6     Eosinophils % 05/12/2021 8.3     Basophils % 05/12/2021 0.6     Neutrophils Absolute 05/12/2021 1.72     Lymphocytes Absolute 05/12/2021 1.10 (L)     Monocytes Absolute 05/12/2021 0.37     Eosinophils Absolute 05/12/2021 0.29     Basophils Absolute 05/12/2021 0.02     Glucose 05/12/2021 68 (L)     Sodium 05/12/2021 137     Potassium 05/12/2021 4.9     Chloride 05/12/2021 97 (L)     Bicarbonate 05/12/2021 26     Anion Gap 05/12/2021 19     Urea Nitrogen 05/12/2021 16     Creatinine 05/12/2021 1.21     GLOMERULAR FILTRATION RA* 05/12/2021 >60     GLOMERULAR FILTRATION RA* 05/12/2021 >60     Calcium 05/12/2021 9.9     Albumin 05/12/2021 5.1 (H)     Alkaline Phosphatase 05/12/2021 56     Total Protein 05/12/2021 7.0     AST 05/12/2021 24     Total Bilirubin 05/12/2021 0.5     ALT (SGPT) 05/12/2021 26        Assessment/Plan   Safety Assessment: no current or past SI, no SA, (+) guns. RF include age,male gender, anxiety . PF include connected family,, future focused, hopeful. low risk    [facility] accessed with no concerns. Awaiting Urine Drug Screen - Ordered 10/26/2023  Diagnoses and all orders for this visit: Patient reports that changing from the ER to IR formulation has been beneficial since effect of drug is no longer wearing off during the day. Patient is satisfied with current treatment plan an reports improved concentration with less distractibility.     Attention deficit hyperactivity disorder (ADHD), predominantly inattentive type  -     amphetamine-dextroamphetamine (AdderalL) 20 mg tablet; Take 1 tablet (20 mg) by mouth 2 times a day.    Future  -     Follow Up In Psychiatry; 3 months     Time     2 minutes chart review  20 minutes direct patient contact  5 minutes charting     27 minutes total time

## 2023-12-04 ASSESSMENT — ENCOUNTER SYMPTOMS
SLEEP DISTURBANCE: 0
HEMATOLOGIC/LYMPHATIC NEGATIVE: 1
APPETITE CHANGE: 0
DECREASED CONCENTRATION: 1
NERVOUS/ANXIOUS: 0
MYALGIAS: 0
ACTIVITY CHANGE: 0
DIZZINESS: 0
AGITATION: 0
DYSPHORIC MOOD: 0
ENDOCRINE NEGATIVE: 1
ABDOMINAL PAIN: 1
APNEA: 0
CONSTIPATION: 1
COUGH: 0
NECK PAIN: 0
LIGHT-HEADEDNESS: 0
CHEST TIGHTNESS: 0
BACK PAIN: 0
NECK STIFFNESS: 0
HEADACHES: 0
INSOMNIA: 0
CHILLS: 0
STRIDOR: 0
SPEECH DIFFICULTY: 0
FACIAL ASYMMETRY: 0
PANIC: 0
JOINT SWELLING: 0
ALLERGIC/IMMUNOLOGIC NEGATIVE: 1
HALLUCINATIONS: 0
MUSCLE TENSION: 0
CHOKING: 0
CONFUSION: 0
EYES NEGATIVE: 1
COMPULSIONS: 0
FATIGUE: 0
SEIZURES: 0
DEPRESSED MOOD: 0
DIARRHEA: 0
TREMORS: 0
RESTLESSNESS: 0
THOUGHT CONTENT - OBSESSIONS: 0
HYPERVENTILATION: 0
WEAKNESS: 0
NUMBNESS: 0
PALPITATIONS: 0
ROS GI COMMENTS: REFLUX
FEELING OF CHOKING: 0
IRRITABILITY: 1
NAUSEA: 0
VOMITING: 1
ARTHRALGIAS: 0
SHORTNESS OF BREATH: 0
HYPERACTIVE: 0
ABDOMINAL DISTENTION: 0

## 2023-12-04 NOTE — PATIENT INSTRUCTIONS
1. Reviewed diagnostic impression including subjective and objective data and provided education about anxiety disorder and ADHD, etiology, treatment recommendations including medication, therapy, course of treatment and prognosis. Patient amendable to treatment plan.    2. Recommendations  START: 20 mg Adderall IR - take one tablet, twice  daily for ADHD     3. Reviewed r/b/a, possible side effects of the medication(s). Client is aware benefit/risks     4. Labs reviewed and discussed - Please complete urine drug screen prior to our next appointment     5. Plan for supportive psychotherapy    Follow up with physical health providers as scheduled    May follow up sooner if experiences worsening symptoms by calling  Psychiatry at [phone]     Patient verbalized an understanding to call Mobile Crisis at [phone] ([facility]), [phone], or 147/go to the nearest emergency room if experiences thoughts of harm to self or others.

## 2024-01-11 DIAGNOSIS — F90.0 ATTENTION DEFICIT HYPERACTIVITY DISORDER (ADHD), PREDOMINANTLY INATTENTIVE TYPE: ICD-10-CM

## 2024-01-11 RX ORDER — DEXTROAMPHETAMINE SACCHARATE, AMPHETAMINE ASPARTATE, DEXTROAMPHETAMINE SULFATE AND AMPHETAMINE SULFATE 5; 5; 5; 5 MG/1; MG/1; MG/1; MG/1
20 TABLET ORAL 2 TIMES DAILY
Qty: 60 TABLET | Refills: 0 | Status: SHIPPED | OUTPATIENT
Start: 2024-01-11 | End: 2024-02-16 | Stop reason: SDUPTHER

## 2024-02-16 DIAGNOSIS — F90.0 ATTENTION DEFICIT HYPERACTIVITY DISORDER (ADHD), PREDOMINANTLY INATTENTIVE TYPE: ICD-10-CM

## 2024-02-16 RX ORDER — DEXTROAMPHETAMINE SACCHARATE, AMPHETAMINE ASPARTATE, DEXTROAMPHETAMINE SULFATE AND AMPHETAMINE SULFATE 5; 5; 5; 5 MG/1; MG/1; MG/1; MG/1
20 TABLET ORAL 2 TIMES DAILY
Qty: 60 TABLET | Refills: 0 | Status: SHIPPED | OUTPATIENT
Start: 2024-02-16 | End: 2024-02-26 | Stop reason: SDUPTHER

## 2024-02-26 ENCOUNTER — TELEMEDICINE (OUTPATIENT)
Dept: BEHAVIORAL HEALTH | Facility: CLINIC | Age: 34
End: 2024-02-26
Payer: COMMERCIAL

## 2024-02-26 DIAGNOSIS — F90.0 ATTENTION DEFICIT HYPERACTIVITY DISORDER (ADHD), PREDOMINANTLY INATTENTIVE TYPE: ICD-10-CM

## 2024-02-26 PROCEDURE — 99213 OFFICE O/P EST LOW 20 MIN: CPT

## 2024-02-26 RX ORDER — DEXTROAMPHETAMINE SACCHARATE, AMPHETAMINE ASPARTATE, DEXTROAMPHETAMINE SULFATE AND AMPHETAMINE SULFATE 5; 5; 5; 5 MG/1; MG/1; MG/1; MG/1
20 TABLET ORAL 2 TIMES DAILY
Qty: 60 TABLET | Refills: 0 | Status: SHIPPED | OUTPATIENT
Start: 2024-03-10 | End: 2024-04-25 | Stop reason: SDUPTHER

## 2024-02-26 RX ORDER — DEXTROAMPHETAMINE SACCHARATE, AMPHETAMINE ASPARTATE, DEXTROAMPHETAMINE SULFATE AND AMPHETAMINE SULFATE 5; 5; 5; 5 MG/1; MG/1; MG/1; MG/1
20 TABLET ORAL DAILY
Qty: 60 TABLET | Refills: 0 | Status: SHIPPED | OUTPATIENT
Start: 2024-04-10 | End: 2024-04-25 | Stop reason: ENTERED-IN-ERROR

## 2024-02-26 NOTE — PROGRESS NOTES
"Subjective   Patient ID: Ishmael Odell is a 33 y.o. male with  past medical history of asthma, insomnia, ADHD and anxiety presenting to outpatient psychiatric for follow up  evaluation and treatment for   anxiety and  ADHD     Chief Compliant - Follow up medication assessment     Patient provided 2 personal identifiers prior to virtual teleconferencing appointment    He is doing well, no complaints.     Patient has fine-tuned his timing for taking each Adderall dose and found a timeline that is working for him.     No problems with mood or anxiety. No SI, HI, or panic attacks.     No symptoms or sings of hypomania or luna.     No pain    Sleeping \"OK,\", never been a great sleeper.    Appetite is good.     No physical complaints or new medical diagnoses.     ADHD  This is a chronic problem. The current episode started more than 1 year ago. The problem is markedly improved.  Pertinent negatives include no arthralgias, chest pain, chills, coughing, fatigue, headaches, joint swelling, myalgias, nausea, neck pain, numbness or weakness.   Anxiety  Presents for follow-up visit. Symptoms include  excessive worry and irritability. Patient reports no chest pain, compulsions, confusion, depressed mood, dizziness, feeling of choking, hyperventilation, insomnia, muscle tension, nausea, nervous/anxious behavior, obsessions, palpitations, panic, restlessness, shortness of breath or suicidal ideas. Symptoms occur most days. The severity of symptoms is mild. The quality of sleep is fair. Nighttime awakenings: occasional.     Compliance with medications is %.     Review of Systems   Constitutional:  Negative for activity change, appetite change, chills and fatigue.   HENT: Negative.     Eyes: Negative.    Respiratory:  Negative for apnea, cough, choking, chest tightness, shortness of breath and stridor.    Cardiovascular:  Negative for chest pain and palpitations.   Gastrointestinal: Negative for abdominal distention, " vomiting, reflux, diarrhea and nausea.   Endocrine: Negative.    Genitourinary: Negative.    Musculoskeletal:  Negative for arthralgias, back pain, gait problem, joint swelling, myalgias, neck pain and neck stiffness.   Skin:  Negative for pallor.   Allergic/Immunologic: Negative.    Neurological:  Negative for dizziness, tremors, seizures, syncope, facial asymmetry, speech difficulty, weakness, light-headedness, numbness and headaches.   Hematological: Negative.    Psychiatric/Behavioral:  Negative for agitation, behavioral problems, confusion, dysphoric mood, hallucinations, self-injury, sleep disturbance and suicidal ideas. The patient is not nervous/anxious, does not have insomnia and is not hyperactive.      Lab Review:   No visits with results within 6 Month(s) from this visit.   Latest known visit with results is:   [facility] Encounter on 05/12/2021   Component Date Value    Cholesterol 05/12/2021 250 (H)     HDL 05/12/2021 32.1 (A)     Cholesterol/HDL Ratio 05/12/2021 7.8 (A)     LDL 05/12/2021 177 (H)     VLDL 05/12/2021 41 (H)     Triglycerides 05/12/2021 204 (H)     Non HDL Cholesterol 05/12/2021 218     WBC 05/12/2021 3.5 (L)     RBC 05/12/2021 5.89     Hemoglobin 05/12/2021 15.9     Hematocrit 05/12/2021 48.8     MCV 05/12/2021 83     MCHC 05/12/2021 32.6     Platelets 05/12/2021 238     RDW 05/12/2021 13.3     Neutrophils % 05/12/2021 49.1     Immature Granulocytes %,* 05/12/2021 0.0     Lymphocytes % 05/12/2021 31.4     Monocytes % 05/12/2021 10.6     Eosinophils % 05/12/2021 8.3     Basophils % 05/12/2021 0.6     Neutrophils Absolute 05/12/2021 1.72     Lymphocytes Absolute 05/12/2021 1.10 (L)     Monocytes Absolute 05/12/2021 0.37     Eosinophils Absolute 05/12/2021 0.29     Basophils Absolute 05/12/2021 0.02     Glucose 05/12/2021 68 (L)     Sodium 05/12/2021 137     Potassium 05/12/2021 4.9     Chloride 05/12/2021 97 (L)     Bicarbonate 05/12/2021 26     Anion Gap 05/12/2021 19     Urea Nitrogen  "05/12/2021 16     Creatinine 05/12/2021 1.21     GLOMERULAR FILTRATION RA* 05/12/2021 >60     GLOMERULAR FILTRATION RA* 05/12/2021 >60     Calcium 05/12/2021 9.9     Albumin 05/12/2021 5.1 (H)     Alkaline Phosphatase 05/12/2021 56     Total Protein 05/12/2021 7.0     AST 05/12/2021 24     Total Bilirubin 05/12/2021 0.5     ALT (SGPT) 05/12/2021 26        Assessment/Plan   Safety Assessment: no current or past SI, no SA, (+) guns. RF include age,male gender, anxiety . PF include connected family,, future focused, hopeful. low risk    Patient is doing well and he has no complaints today. Denies depressed mood, anxiety, SI, HI, or panic attacks. Patient satisfied with effects of Adderall with much improved mood and less distractible. Patient describes improvement in ability to multi-task. Patient remindee that he will need to complete urine drug screen prior to next appointment    O[facility]RS accessed with no concerns. Awaiting urine drug screen.     Attention deficit hyperactivity disorder (ADHD), predominantly inattentive type  -     amphetamine-dextroamphetamine (AdderalL) 20 mg tablet; Take 1 tablet (20 mg) by mouth 2 times a day.    -     Drug Screen, Urine With Reflex to Confirmation;     Future  -     Follow Up In Psychiatry; 3 months     Time     2 minutes chart review  20 minutes direct patient contacted   5 minutes charting     Total Time 27 minutes                                 Patient describes his typical day and states that he starts his day at 4 am and finishes work at noon. He feels that he is initially productive but he notices that by 4 pm, he begins to lose focused and \"becomes crabby.\"     He feels that he is tolerating Adderall at the ER dose well and is hesitant to make changes.     Patient denies being depressed and he has no thoughts of SI or HI .  He identifies himself as a worrier but he hasn't experienced panic attacks.     Sleep is \"OK\" and his appetite is described as fair.     ADHD  This is a " chronic problem. The current episode started more than 1 year ago. The problem occurs constantly. The problem has been gradually improving. Associated symptoms include abdominal pain and vomiting. Pertinent negatives include no arthralgias, chest pain, chills, coughing, fatigue, headaches, joint swelling, myalgias, nausea, neck pain, numbness or weakness. Treatments tried: Medication - Adderall. The treatment provided mild relief.   Anxiety  Presents for follow-up visit. Symptoms include decreased concentration, excessive worry and irritability. Patient reports no chest pain, compulsions, confusion, depressed mood, dizziness, feeling of choking, hyperventilation, insomnia, muscle tension, nausea, nervous/anxious behavior, obsessions, palpitations, panic, restlessness, shortness of breath or suicidal ideas. Symptoms occur most days. The severity of symptoms is mild. The quality of sleep is fair. Nighttime awakenings: occasional.     Compliance with medications is %.     Review of Systems   Constitutional:  Positive for irritability. Negative for activity change, appetite change, chills and fatigue.   HENT: Negative.     Eyes: Negative.    Respiratory:  Negative for apnea, cough, choking, chest tightness, shortness of breath and stridor.    Cardiovascular:  Negative for chest pain and palpitations.   Gastrointestinal:  Positive for abdominal pain, constipation and vomiting. Negative for abdominal distention, diarrhea and nausea.        Reflux    Endocrine: Negative.    Genitourinary: Negative.    Musculoskeletal:  Negative for arthralgias, back pain, gait problem, joint swelling, myalgias, neck pain and neck stiffness.   Skin:  Negative for pallor.   Allergic/Immunologic: Negative.    Neurological:  Negative for dizziness, tremors, seizures, syncope, facial asymmetry, speech difficulty, weakness, light-headedness, numbness and headaches.   Hematological: Negative.    Psychiatric/Behavioral:  Positive for  decreased concentration. Negative for agitation, behavioral problems, confusion, dysphoric mood, hallucinations, self-injury, sleep disturbance and suicidal ideas. The patient is not nervous/anxious, does not have insomnia and is not hyperactive.      Mental Status Exam    General: Appropriately groomed and dressed   Appearance: Appears stated age   Attitude: Calm, cooperative   Behavior: Appropriate eye contact.   Motor Activity: No agitation or retardation. No EPS/TD. Gait not assessed - virtual   Speech: Regular rate, rhythm, volume and tone, spontaneous, fluent.   Mood: Euthymic   Affect: Appropriate with full range   Thought Process: Organized, linear, goal directed. Associations are logical.   Thought Content: Does not endorse suicidal or homicidal ideation, no delusions elicited.   Thought Perception: Does not endorse auditory or visual hallucinations, does not appear to be responding to hallucinatory stimuli.   Cognition: Alert, oriented x3. No deficits noted. Adequate fund of knowledge. No deficit in recent and remote memory. No deficits in attention, concentration or language.    Insight: Good, as patient recognizes symptoms of illness and need for recommended treatments.   Judgment: Can make reasonable decisions about ordinary activities of daily living and necessary medical care recommendations.        Lab Review:   No visits with results within 6 Month(s) from this visit.   Latest known visit with results is:   [facility] Encounter on 05/12/2021   Component Date Value    Cholesterol 05/12/2021 250 (H)     HDL 05/12/2021 32.1 (A)     Cholesterol/HDL Ratio 05/12/2021 7.8 (A)     LDL 05/12/2021 177 (H)     VLDL 05/12/2021 41 (H)     Triglycerides 05/12/2021 204 (H)     Non HDL Cholesterol 05/12/2021 218     WBC 05/12/2021 3.5 (L)     RBC 05/12/2021 5.89     Hemoglobin 05/12/2021 15.9     Hematocrit 05/12/2021 48.8     MCV 05/12/2021 83     MCHC 05/12/2021 32.6     Platelets 05/12/2021 238     RDW 05/12/2021  13.3     Neutrophils % 05/12/2021 49.1     Immature Granulocytes %,* 05/12/2021 0.0     Lymphocytes % 05/12/2021 31.4     Monocytes % 05/12/2021 10.6     Eosinophils % 05/12/2021 8.3     Basophils % 05/12/2021 0.6     Neutrophils Absolute 05/12/2021 1.72     Lymphocytes Absolute 05/12/2021 1.10 (L)     Monocytes Absolute 05/12/2021 0.37     Eosinophils Absolute 05/12/2021 0.29     Basophils Absolute 05/12/2021 0.02     Glucose 05/12/2021 68 (L)     Sodium 05/12/2021 137     Potassium 05/12/2021 4.9     Chloride 05/12/2021 97 (L)     Bicarbonate 05/12/2021 26     Anion Gap 05/12/2021 19     Urea Nitrogen 05/12/2021 16     Creatinine 05/12/2021 1.21     GLOMERULAR FILTRATION RA* 05/12/2021 >60     GLOMERULAR FILTRATION RA* 05/12/2021 >60     Calcium 05/12/2021 9.9     Albumin 05/12/2021 5.1 (H)     Alkaline Phosphatase 05/12/2021 56     Total Protein 05/12/2021 7.0     AST 05/12/2021 24     Total Bilirubin 05/12/2021 0.5     ALT (SGPT) 05/12/2021 26        Assessment/Plan   Safety Assessment: no current or past SI, no SA, (+) guns. RF include age,male gender, anxiety . PF include connected family,, future focused, hopeful. low risk    OARRS accessed with no concerns. Awaiting Urine Drug Screen - Ordered 10/26/2023  Diagnoses and all orders for this visit: Patient reports that changing from the ER to IR formulation has been beneficial since effect of drug is no longer wearing off during the day. Patient is satisfied with current treatment plan an reports improved concentration with less distractibility.     Attention deficit hyperactivity disorder (ADHD), predominantly inattentive type  -     amphetamine-dextroamphetamine (AdderalL) 20 mg tablet; Take 1 tablet (20 mg) by mouth 2 times a day.    Future  -     Follow Up In Psychiatry; 3 months     Time     2 minutes chart review  20 minutes direct patient contact  5 minutes charting     27 minutes total time

## 2024-02-27 NOTE — PATIENT INSTRUCTIONS
1. Reviewed diagnostic impression including subjective and objective data and provided education about anxiety disorder and ADHD, etiology, treatment recommendations including medication, therapy, course of treatment and prognosis. Patient amendable to treatment plan.    2. Recommendations  START: 20 mg Adderall IR - take one tablet, twice  daily for ADHD     3. Reviewed r/b/a, possible side effects of the medication(s). Client is aware benefit/risks     4. Labs reviewed and discussed - Please complete urine drug screen prior to our next appointment     5. Plan for supportive psychotherapy    6. Follow up with physical health providers as scheduled    7. Follow up in 3 months     May follow up sooner if experiences worsening symptoms by calling  Psychiatry at [phone]     Patient verbalized an understanding to call [facility] at [phone] ([facility]), 211, or 340/go to the nearest emergency room if experiences thoughts of harm to self or others.

## 2024-03-14 ENCOUNTER — OFFICE VISIT (OUTPATIENT)
Dept: PRIMARY CARE | Facility: CLINIC | Age: 34
End: 2024-03-14
Payer: COMMERCIAL

## 2024-03-14 VITALS
SYSTOLIC BLOOD PRESSURE: 133 MMHG | HEART RATE: 100 BPM | WEIGHT: 204 LBS | OXYGEN SATURATION: 97 % | DIASTOLIC BLOOD PRESSURE: 88 MMHG | BODY MASS INDEX: 31.95 KG/M2

## 2024-03-14 DIAGNOSIS — E29.1 HYPOGONADISM IN MALE: ICD-10-CM

## 2024-03-14 DIAGNOSIS — R29.818 SUSPECTED SLEEP APNEA: ICD-10-CM

## 2024-03-14 DIAGNOSIS — I10 PRIMARY HYPERTENSION: Primary | ICD-10-CM

## 2024-03-14 PROCEDURE — 3075F SYST BP GE 130 - 139MM HG: CPT | Performed by: INTERNAL MEDICINE

## 2024-03-14 PROCEDURE — 99213 OFFICE O/P EST LOW 20 MIN: CPT | Performed by: INTERNAL MEDICINE

## 2024-03-14 PROCEDURE — 3079F DIAST BP 80-89 MM HG: CPT | Performed by: INTERNAL MEDICINE

## 2024-03-14 RX ORDER — OLMESARTAN MEDOXOMIL 20 MG/1
20 TABLET ORAL DAILY
Qty: 30 TABLET | Refills: 5 | Status: SHIPPED | OUTPATIENT
Start: 2024-03-14 | End: 2024-09-10

## 2024-03-14 RX ORDER — PROPRANOLOL HYDROCHLORIDE 10 MG/1
10 TABLET ORAL 3 TIMES DAILY
COMMUNITY

## 2024-03-14 NOTE — PATIENT INSTRUCTIONS
Start olemesartan at bedtime  Check BP once daily - write it down  [phone] for sleep medicinee  Fasting labs in 4 weeks  Come back in 6 months

## 2024-03-14 NOTE — PROGRESS NOTES
Subjective   Patient ID: Ishmael Odell is a 33 y.o. male who presents for Hypertension.    HPI     Reports SBP 1301-140s/80s--as high as 156. High for the past few months. Gets occasional headaches. No numbness/tingling. Dad and Grandfather have HTN.   He does snore at night and does admit to anabolic steroid use.   He takes Adderall for ADHD      Review of Systems   All other systems reviewed and are negative.      Objective   Wt 92.5 kg (204 lb)   BMI 31.95 kg/m²     Physical Exam  Constitutional:       Appearance: Normal appearance.   HENT:      Head: Normocephalic and atraumatic.   Cardiovascular:      Rate and Rhythm: Normal rate and regular rhythm.      Heart sounds: Normal heart sounds. No murmur heard.     No gallop.   Pulmonary:      Effort: Pulmonary effort is normal. No respiratory distress.      Breath sounds: No wheezing or rales.   Skin:     General: Skin is warm and dry.      Findings: No rash.   Neurological:      Mental Status: He is alert and oriented to person, place, and time. Mental status is at baseline.   Psychiatric:         Mood and Affect: Mood normal.         Behavior: Behavior normal.         Assessment/Plan       #HTN  -Primary vs anabolic steroids vs RAYSHAWN   -Start olmesartan 20mg daily   -Refer to sleep medicine for PSG  -Encouraged cessation of anabolic steroid use   -Order CBC, CMP, lipids, UA, test level. Estradiol and IGF-1    RTC 6 weeks

## 2024-04-17 ENCOUNTER — LAB (OUTPATIENT)
Dept: LAB | Facility: LAB | Age: 34
End: 2024-04-17
Payer: COMMERCIAL

## 2024-04-17 DIAGNOSIS — I10 PRIMARY HYPERTENSION: ICD-10-CM

## 2024-04-17 DIAGNOSIS — E29.1 HYPOGONADISM IN MALE: ICD-10-CM

## 2024-04-17 DIAGNOSIS — F90.0 ATTENTION DEFICIT HYPERACTIVITY DISORDER (ADHD), PREDOMINANTLY INATTENTIVE TYPE: ICD-10-CM

## 2024-04-17 LAB
ALBUMIN SERPL BCP-MCNC: 4.7 G/DL (ref 3.4–5)
ALP SERPL-CCNC: 45 U/L (ref 33–120)
ALT SERPL W P-5'-P-CCNC: 42 U/L (ref 10–52)
AMPHETAMINES UR QL SCN: ABNORMAL
ANION GAP SERPL CALC-SCNC: 13 MMOL/L (ref 10–20)
APPEARANCE UR: CLEAR
AST SERPL W P-5'-P-CCNC: 46 U/L (ref 9–39)
BARBITURATES UR QL SCN: ABNORMAL
BENZODIAZ UR QL SCN: ABNORMAL
BILIRUB SERPL-MCNC: 0.4 MG/DL (ref 0–1.2)
BILIRUB UR STRIP.AUTO-MCNC: NEGATIVE MG/DL
BUN SERPL-MCNC: 21 MG/DL (ref 6–23)
BZE UR QL SCN: ABNORMAL
CALCIUM SERPL-MCNC: 9.4 MG/DL (ref 8.6–10.3)
CANNABINOIDS UR QL SCN: ABNORMAL
CHLORIDE SERPL-SCNC: 101 MMOL/L (ref 98–107)
CHOLEST SERPL-MCNC: 162 MG/DL (ref 0–199)
CHOLESTEROL/HDL RATIO: 4.8
CO2 SERPL-SCNC: 29 MMOL/L (ref 21–32)
COLOR UR: YELLOW
CREAT SERPL-MCNC: 1.55 MG/DL (ref 0.5–1.3)
EGFRCR SERPLBLD CKD-EPI 2021: 61 ML/MIN/1.73M*2
ERYTHROCYTE [DISTWIDTH] IN BLOOD BY AUTOMATED COUNT: 17.7 % (ref 11.5–14.5)
FENTANYL+NORFENTANYL UR QL SCN: ABNORMAL
GLUCOSE SERPL-MCNC: 74 MG/DL (ref 74–99)
GLUCOSE UR STRIP.AUTO-MCNC: NEGATIVE MG/DL
HCT VFR BLD AUTO: 50.1 % (ref 41–52)
HDLC SERPL-MCNC: 33.8 MG/DL
HGB BLD-MCNC: 15.2 G/DL (ref 13.5–17.5)
KETONES UR STRIP.AUTO-MCNC: NEGATIVE MG/DL
LDLC SERPL CALC-MCNC: 116 MG/DL
LEUKOCYTE ESTERASE UR QL STRIP.AUTO: NEGATIVE
MCH RBC QN AUTO: 24.5 PG (ref 26–34)
MCHC RBC AUTO-ENTMCNC: 30.3 G/DL (ref 32–36)
MCV RBC AUTO: 81 FL (ref 80–100)
METHADONE UR QL SCN: ABNORMAL
NITRITE UR QL STRIP.AUTO: NEGATIVE
NON HDL CHOLESTEROL: 128 MG/DL (ref 0–149)
NRBC BLD-RTO: 0 /100 WBCS (ref 0–0)
OPIATES UR QL SCN: ABNORMAL
OXYCODONE+OXYMORPHONE UR QL SCN: ABNORMAL
PCP UR QL SCN: ABNORMAL
PH UR STRIP.AUTO: 9 [PH]
PLATELET # BLD AUTO: 312 X10*3/UL (ref 150–450)
POTASSIUM SERPL-SCNC: 4.4 MMOL/L (ref 3.5–5.3)
PROT SERPL-MCNC: 6.9 G/DL (ref 6.4–8.2)
PROT UR STRIP.AUTO-MCNC: NEGATIVE MG/DL
RBC # BLD AUTO: 6.2 X10*6/UL (ref 4.5–5.9)
RBC # UR STRIP.AUTO: NEGATIVE /UL
SODIUM SERPL-SCNC: 139 MMOL/L (ref 136–145)
SP GR UR STRIP.AUTO: 1.01
TRIGL SERPL-MCNC: 63 MG/DL (ref 0–149)
UROBILINOGEN UR STRIP.AUTO-MCNC: <2 MG/DL
VLDL: 13 MG/DL (ref 0–40)
WBC # BLD AUTO: 5.4 X10*3/UL (ref 4.4–11.3)

## 2024-04-17 PROCEDURE — 81003 URINALYSIS AUTO W/O SCOPE: CPT

## 2024-04-17 PROCEDURE — 80349 CANNABINOIDS NATURAL: CPT

## 2024-04-17 PROCEDURE — 36415 COLL VENOUS BLD VENIPUNCTURE: CPT

## 2024-04-17 PROCEDURE — 84402 ASSAY OF FREE TESTOSTERONE: CPT

## 2024-04-17 PROCEDURE — 80053 COMPREHEN METABOLIC PANEL: CPT

## 2024-04-17 PROCEDURE — 80061 LIPID PANEL: CPT

## 2024-04-17 PROCEDURE — 80307 DRUG TEST PRSMV CHEM ANLYZR: CPT

## 2024-04-17 PROCEDURE — 84305 ASSAY OF SOMATOMEDIN: CPT

## 2024-04-17 PROCEDURE — 85027 COMPLETE CBC AUTOMATED: CPT

## 2024-04-17 PROCEDURE — 82670 ASSAY OF TOTAL ESTRADIOL: CPT

## 2024-04-18 LAB — ESTRADIOL SERPL-MCNC: <19 PG/ML

## 2024-04-19 LAB
IGF-I SERPL-MCNC: 178 NG/ML (ref 82–243)
IGF-I Z-SCORE SERPL: 0.5

## 2024-04-21 LAB — CARBOXYTHC UR-MCNC: 69 NG/ML

## 2024-04-22 LAB
TESTOSTERONE FREE (CHAN): 205.6 PG/ML (ref 35–155)
TESTOSTERONE,TOTAL,LC-MS/MS: 603 NG/DL (ref 250–1100)

## 2024-04-23 NOTE — PROGRESS NOTES
Subjective   Patient ID: Ishmael Odell is a 33 y.o. male who presents for Follow-up (6 week recheck ).    HPI     Review of Systems    Objective   There were no vitals taken for this visit.    Physical Exam    Assessment/Plan   {Assess/PlanSmartLinks:72511}

## 2024-04-25 ENCOUNTER — OFFICE VISIT (OUTPATIENT)
Dept: PRIMARY CARE | Facility: CLINIC | Age: 34
End: 2024-04-25
Payer: COMMERCIAL

## 2024-04-25 DIAGNOSIS — F90.0 ATTENTION DEFICIT HYPERACTIVITY DISORDER (ADHD), PREDOMINANTLY INATTENTIVE TYPE: ICD-10-CM

## 2024-04-25 RX ORDER — DEXTROAMPHETAMINE SACCHARATE, AMPHETAMINE ASPARTATE, DEXTROAMPHETAMINE SULFATE AND AMPHETAMINE SULFATE 5; 5; 5; 5 MG/1; MG/1; MG/1; MG/1
20 TABLET ORAL 2 TIMES DAILY
Qty: 60 TABLET | Refills: 0 | Status: SHIPPED | OUTPATIENT
Start: 2024-04-25 | End: 2024-06-02 | Stop reason: SDUPTHER

## 2024-04-26 ENCOUNTER — OFFICE VISIT (OUTPATIENT)
Dept: PRIMARY CARE | Facility: CLINIC | Age: 34
End: 2024-04-26
Payer: COMMERCIAL

## 2024-04-26 VITALS
WEIGHT: 202 LBS | HEART RATE: 74 BPM | DIASTOLIC BLOOD PRESSURE: 57 MMHG | HEIGHT: 67 IN | BODY MASS INDEX: 31.71 KG/M2 | OXYGEN SATURATION: 97 % | SYSTOLIC BLOOD PRESSURE: 103 MMHG

## 2024-04-26 DIAGNOSIS — I10 PRIMARY HYPERTENSION: Primary | ICD-10-CM

## 2024-04-26 PROCEDURE — 3078F DIAST BP <80 MM HG: CPT | Performed by: INTERNAL MEDICINE

## 2024-04-26 PROCEDURE — 3074F SYST BP LT 130 MM HG: CPT | Performed by: INTERNAL MEDICINE

## 2024-04-26 PROCEDURE — 99213 OFFICE O/P EST LOW 20 MIN: CPT | Performed by: INTERNAL MEDICINE

## 2024-04-26 ASSESSMENT — PATIENT HEALTH QUESTIONNAIRE - PHQ9
2. FEELING DOWN, DEPRESSED OR HOPELESS: NOT AT ALL
1. LITTLE INTEREST OR PLEASURE IN DOING THINGS: NOT AT ALL
SUM OF ALL RESPONSES TO PHQ9 QUESTIONS 1 AND 2: 0

## 2024-04-26 NOTE — PATIENT INSTRUCTIONS
Your blood pressure was elevated today. Please check your blood pressure 1-2x daily and write it down. Send the results via [facility]. Goal BP is less than 130/80. I want top number above 110 and bottom above 60  To check BP: rest for 5 mintues, have your feet flat/uncrossed on the floor and your arm at heart level.    Get sleep study     6 months

## 2024-04-26 NOTE — PROGRESS NOTES
Subjective   Patient ID: Ishmael Odell is a 33 y.o. male who presents for No chief complaint on file..    HPI   Blood pressure on intake today was 103/56. Does not currently monitor blood pressure at home but reports blood pressure has been around 118/64. Denies headaches, numbness/tingling  He had an appointment for a sleep study consultation but had to cancel it, will reschedule in the near future  He does snore at night and does admit to anabolic steroid use, currently on 125 of testosterone a week  He takes Adderall for ADHD  Denies chest pain, shortness of breath, or leg edema    Review of Systems    Objective   There were no vitals taken for this visit.    Physical Exam  CONSTITUTIONAL - alert, in no acute distress, not ill-appearing  SKIN - normal skin color, warm  HEAD - no trauma, normocephalic  CHEST - clear to auscultation, no wheezing, no crackles and no rales, good effort  CARDIAC - regular rate and regular rhythm, no murmur  EXTREMITIES - no edema, no deformities  NEUROLOGICAL - alert, oriented x3 and no acute focal signs  PSYCHIATRIC - alert, pleasant and cordial, age-appropriate       Assessment/Plan   #HTN  -Primary vs anabolic steroids vs RAYSHAWN   -Cont. olmesartan 20mg daily , decrease to 10mg if <110/60  -Will follow up with sleep medicine for possible PSG     Labs reviewed while in office     RTC 6 months    Note written by KRYSTAL Umaña-III. Medical student note requires physician co-sign.

## 2024-06-02 DIAGNOSIS — F90.0 ATTENTION DEFICIT HYPERACTIVITY DISORDER (ADHD), PREDOMINANTLY INATTENTIVE TYPE: ICD-10-CM

## 2024-06-02 RX ORDER — DEXTROAMPHETAMINE SACCHARATE, AMPHETAMINE ASPARTATE, DEXTROAMPHETAMINE SULFATE AND AMPHETAMINE SULFATE 5; 5; 5; 5 MG/1; MG/1; MG/1; MG/1
20 TABLET ORAL 2 TIMES DAILY
Qty: 60 TABLET | Refills: 0 | Status: SHIPPED | OUTPATIENT
Start: 2024-06-02 | End: 2025-06-02

## 2024-07-20 ENCOUNTER — TELEMEDICINE (OUTPATIENT)
Dept: BEHAVIORAL HEALTH | Facility: CLINIC | Age: 34
End: 2024-07-20
Payer: COMMERCIAL

## 2024-07-20 DIAGNOSIS — F90.0 ATTENTION DEFICIT HYPERACTIVITY DISORDER (ADHD), PREDOMINANTLY INATTENTIVE TYPE: ICD-10-CM

## 2024-07-20 PROCEDURE — 99214 OFFICE O/P EST MOD 30 MIN: CPT

## 2024-07-20 RX ORDER — DEXTROAMPHETAMINE SACCHARATE, AMPHETAMINE ASPARTATE, DEXTROAMPHETAMINE SULFATE AND AMPHETAMINE SULFATE 5; 5; 5; 5 MG/1; MG/1; MG/1; MG/1
20 TABLET ORAL 2 TIMES DAILY
Qty: 60 TABLET | Refills: 0 | Status: SHIPPED | OUTPATIENT
Start: 2024-07-20 | End: 2025-07-20

## 2024-07-20 NOTE — PROGRESS NOTES
Subjective   Patient ID: Ishmael Odell is a 34 y.o. male with  past medical history of asthma, insomnia, ADHD and anxiety presenting to outpatient psychiatric for follow up  evaluation and treatment for   anxiety and  ADHD.      Chief Compliant - Follow up medication assessment     Patient provided 2 personal identifiers prior to virtual teleconferencing appointment    Work has been busy. Adderall is effecting for treating symptoms. He take his first dose at 530 to 6 am. She takes her later dose at 1 pm. It interferes with his ability to sleep if he takes too late.     He is In [place], [place] today [place] a [place].     No symptoms of depression or anxiety. No SI, HI, or Panic Attacks.     No physical complaints or new medical diagnosis.     ADHD  This is a chronic problem. The current episode started more than 1 year ago. The problem is markedly improved.  Pertinent negatives include no arthralgias, chest pain, chills, coughing, fatigue, headaches, joint swelling, myalgias, nausea, neck pain, numbness or weakness.   Anxiety  Presents for follow-up visit. Symptoms include  excessive worry and irritability. Patient reports no chest pain, compulsions, confusion, depressed mood, dizziness, feeling of choking, hyperventilation, insomnia, muscle tension, nausea, nervous/anxious behavior, obsessions, palpitations, panic, restlessness, shortness of breath or suicidal ideas. Symptoms occur most days. The severity of symptoms is mild. The quality of sleep is fair. Nighttime awakenings: occasional.     Compliance with medications is %.     Review of Systems   Constitutional:  Negative for activity change, appetite change, chills and fatigue.   HENT: Negative.     Eyes: Negative.    Respiratory:  Negative for apnea, cough, choking, chest tightness, shortness of breath and stridor.    Cardiovascular:  Negative for chest pain and palpitations.   Gastrointestinal: Negative for abdominal distention, vomiting, reflux,  diarrhea and nausea.   Endocrine: Negative.    Genitourinary: Negative.    Musculoskeletal:  Negative for arthralgias, back pain, gait problem, joint swelling, myalgias, neck pain and neck stiffness.   Skin:  Negative for pallor.   Allergic/Immunologic: Negative.    Neurological:  Negative for dizziness, tremors, seizures, syncope, facial asymmetry, speech difficulty, weakness, light-headedness, numbness and headaches.   Hematological: Negative.    Psychiatric/Behavioral:  Negative for agitation, behavioral problems, confusion, dysphoric mood, hallucinations, self-injury, sleep disturbance and suicidal ideas. The patient is not nervous/anxious, does not have insomnia and is not hyperactive.      Lab Review:   No visits with results within 6 Month(s) from this visit.   Latest known visit with results is:   Legacy Encounter on 05/12/2021   Component Date Value    Cholesterol 05/12/2021 250 (H)     HDL 05/12/2021 32.1 (A)     Cholesterol/HDL Ratio 05/12/2021 7.8 (A)     LDL 05/12/2021 177 (H)     VLDL 05/12/2021 41 (H)     Triglycerides 05/12/2021 204 (H)     Non HDL Cholesterol 05/12/2021 218     WBC 05/12/2021 3.5 (L)     RBC 05/12/2021 5.89     Hemoglobin 05/12/2021 15.9     Hematocrit 05/12/2021 48.8     MCV 05/12/2021 83     MCHC 05/12/2021 32.6     Platelets 05/12/2021 238     RDW 05/12/2021 13.3     Neutrophils % 05/12/2021 49.1     Immature Granulocytes %,* 05/12/2021 0.0     Lymphocytes % 05/12/2021 31.4     Monocytes % 05/12/2021 10.6     Eosinophils % 05/12/2021 8.3     Basophils % 05/12/2021 0.6     Neutrophils Absolute 05/12/2021 1.72     Lymphocytes Absolute 05/12/2021 1.10 (L)     Monocytes Absolute 05/12/2021 0.37     Eosinophils Absolute 05/12/2021 0.29     Basophils Absolute 05/12/2021 0.02     Glucose 05/12/2021 68 (L)     Sodium 05/12/2021 137     Potassium 05/12/2021 4.9     Chloride 05/12/2021 97 (L)     Bicarbonate 05/12/2021 26     Anion Gap 05/12/2021 19     Urea Nitrogen 05/12/2021 16      Creatinine 05/12/2021 1.21     GLOMERULAR FILTRATION RA* 05/12/2021 >60     GLOMERULAR FILTRATION RA* 05/12/2021 >60     Calcium 05/12/2021 9.9     Albumin 05/12/2021 5.1 (H)     Alkaline Phosphatase 05/12/2021 56     Total Protein 05/12/2021 7.0     AST 05/12/2021 24     Total Bilirubin 05/12/2021 0.5     ALT (SGPT) 05/12/2021 26        Assessment/Plan   Safety Assessment: no current or past SI, no SA, (+) guns. RF include age,male gender, anxiety . PF include connected family,, future focused, hopeful. low risk    Patient is doing well and he has no complaints today. The patient is doing well with Adderall. No problems with depressed mood or anxiety. No HI, SI, or Panic Attacks..   No new physical complaints.     OARRS accessed with no concerns. Awaiting urine drug screen.     Attention deficit hyperactivity disorder (ADHD), predominantly inattentive type  -     amphetamine-dextroamphetamine (AdderalL) 20 mg tablet; Take 1 tablet (20 mg) by mouth 2 times a day.    Future - patient will call to make next appointment.     -     Follow Up In Psychiatry; 3 months     Time     2 minutes chart review  20 minutes direct patient contacted   5 minutes charting     Total Time 27 minutes

## 2024-07-20 NOTE — PATIENT INSTRUCTIONS
1. Reviewed diagnostic impression including subjective and objective data and provided education about anxiety disorder and ADHD, etiology, treatment recommendations including medication, therapy, course of treatment and prognosis. Patient amendable to treatment plan.    2. Recommendations  START: 20 mg Adderall IR - take one tablet, twice  daily for ADHD     3. Reviewed r/b/a, possible side effects of the medication(s). Client is aware benefit/risks     4. Labs reviewed and discussed - Please complete urine drug screen prior to our next appointment     5. Plan for supportive psychotherapy    6. Follow up with physical health providers as scheduled    7. Follow up in 3 months     May follow up sooner if experiences worsening symptoms by calling  Psychiatry at [phone]     Patient verbalized an understanding to call [facility] at [phone] ([facility]), 211, or [phone]/go to the nearest emergency room if experiences thoughts of harm to self or others.

## 2024-09-28 ENCOUNTER — APPOINTMENT (OUTPATIENT)
Dept: BEHAVIORAL HEALTH | Facility: CLINIC | Age: 34
End: 2024-09-28
Payer: COMMERCIAL

## 2024-09-28 DIAGNOSIS — F32.0 CURRENT MILD EPISODE OF MAJOR DEPRESSIVE DISORDER WITHOUT PRIOR EPISODE (CMS-HCC): ICD-10-CM

## 2024-09-28 DIAGNOSIS — F90.0 ATTENTION DEFICIT HYPERACTIVITY DISORDER (ADHD), PREDOMINANTLY INATTENTIVE TYPE: ICD-10-CM

## 2024-09-28 PROCEDURE — 99214 OFFICE O/P EST MOD 30 MIN: CPT

## 2024-09-28 RX ORDER — FLUOXETINE HYDROCHLORIDE 20 MG/1
20 CAPSULE ORAL DAILY
Qty: 30 CAPSULE | Refills: 1 | Status: SHIPPED | OUTPATIENT
Start: 2024-09-28 | End: 2025-09-28

## 2024-09-28 RX ORDER — DEXTROAMPHETAMINE SACCHARATE, AMPHETAMINE ASPARTATE, DEXTROAMPHETAMINE SULFATE AND AMPHETAMINE SULFATE 5; 5; 5; 5 MG/1; MG/1; MG/1; MG/1
20 TABLET ORAL 2 TIMES DAILY
Qty: 60 TABLET | Refills: 0 | Status: SHIPPED | OUTPATIENT
Start: 2024-09-28 | End: 2025-09-28
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
## 2024-10-04 NOTE — PATIENT INSTRUCTIONS
1. Reviewed diagnostic impression including subjective and objective data and provided education about anxiety disorder and ADHD, etiology, treatment recommendations including medication, therapy, course of treatment and prognosis. Patient amendable to treatment plan.     2. Recommendations  CONTINUE : 20 mg Adderall IR - take one tablet, twice  daily for ADHD  START Fluoxetine 20 mg PO every day for Depression       3. Reviewed r/b/a, possible side effects of the medication(s). Client is aware benefit/risks      4. Labs reviewed and discussed - Please complete urine drug screen prior to our next appointment      5. Plan for supportive psychotherapy     6. Follow up with physical health providers as scheduled     7. Follow up 11/2/2024      May follow up sooner if experiences worsening symptoms by calling  Psychiatry at [phone]     Patient verbalized an understanding to call [facility] at [phone] ([facility]), [phone], or [phone]/go to the nearest emergency room if experiences thoughts of harm to self or others.

## 2024-10-31 DIAGNOSIS — F90.0 ATTENTION DEFICIT HYPERACTIVITY DISORDER (ADHD), PREDOMINANTLY INATTENTIVE TYPE: ICD-10-CM

## 2024-10-31 DIAGNOSIS — F32.0 CURRENT MILD EPISODE OF MAJOR DEPRESSIVE DISORDER WITHOUT PRIOR EPISODE (CMS-HCC): ICD-10-CM

## 2024-10-31 RX ORDER — DEXTROAMPHETAMINE SACCHARATE, AMPHETAMINE ASPARTATE, DEXTROAMPHETAMINE SULFATE AND AMPHETAMINE SULFATE 5; 5; 5; 5 MG/1; MG/1; MG/1; MG/1
20 TABLET ORAL 2 TIMES DAILY
Qty: 60 TABLET | Refills: 0 | Status: SHIPPED | OUTPATIENT
Start: 2024-10-31 | End: 2025-10-31

## 2024-10-31 RX ORDER — FLUOXETINE HYDROCHLORIDE 20 MG/1
20 CAPSULE ORAL DAILY
Qty: 30 CAPSULE | Refills: 1 | Status: SHIPPED | OUTPATIENT
Start: 2024-10-31 | End: 2025-10-31

## 2024-11-02 ENCOUNTER — APPOINTMENT (OUTPATIENT)
Dept: BEHAVIORAL HEALTH | Facility: CLINIC | Age: 34
End: 2024-11-02
Payer: COMMERCIAL

## 2024-11-02 DIAGNOSIS — F90.0 ATTENTION DEFICIT HYPERACTIVITY DISORDER (ADHD), PREDOMINANTLY INATTENTIVE TYPE: ICD-10-CM

## 2024-11-02 DIAGNOSIS — F32.0 CURRENT MILD EPISODE OF MAJOR DEPRESSIVE DISORDER WITHOUT PRIOR EPISODE (CMS-HCC): ICD-10-CM

## 2024-11-02 PROCEDURE — 99214 OFFICE O/P EST MOD 30 MIN: CPT

## 2024-11-04 ENCOUNTER — APPOINTMENT (OUTPATIENT)
Dept: PRIMARY CARE | Facility: CLINIC | Age: 34
End: 2024-11-04
Payer: COMMERCIAL

## 2024-11-04 VITALS
HEART RATE: 100 BPM | OXYGEN SATURATION: 96 % | SYSTOLIC BLOOD PRESSURE: 131 MMHG | BODY MASS INDEX: 28.82 KG/M2 | WEIGHT: 184 LBS | DIASTOLIC BLOOD PRESSURE: 80 MMHG

## 2024-11-04 DIAGNOSIS — N52.9 ERECTILE DYSFUNCTION, UNSPECIFIED ERECTILE DYSFUNCTION TYPE: ICD-10-CM

## 2024-11-04 DIAGNOSIS — J45.40 MODERATE PERSISTENT ASTHMA WITHOUT COMPLICATION (HHS-HCC): ICD-10-CM

## 2024-11-04 DIAGNOSIS — I10 PRIMARY HYPERTENSION: Primary | ICD-10-CM

## 2024-11-04 DIAGNOSIS — Z82.49 FAMILY HISTORY OF CORONARY ARTERY DISEASE: ICD-10-CM

## 2024-11-04 PROCEDURE — 3075F SYST BP GE 130 - 139MM HG: CPT | Performed by: INTERNAL MEDICINE

## 2024-11-04 PROCEDURE — 3079F DIAST BP 80-89 MM HG: CPT | Performed by: INTERNAL MEDICINE

## 2024-11-04 PROCEDURE — 99214 OFFICE O/P EST MOD 30 MIN: CPT | Performed by: INTERNAL MEDICINE

## 2024-11-04 RX ORDER — AZITHROMYCIN 250 MG/1
TABLET, FILM COATED ORAL
Qty: 6 TABLET | Refills: 0 | Status: SHIPPED | OUTPATIENT
Start: 2024-11-04 | End: 2024-11-09

## 2024-11-04 RX ORDER — SILDENAFIL 50 MG/1
50 TABLET, FILM COATED ORAL DAILY PRN
Qty: 30 TABLET | Refills: 3 | Status: SHIPPED | OUTPATIENT
Start: 2024-11-04 | End: 2025-11-04

## 2024-11-04 RX ORDER — ALBUTEROL SULFATE 90 UG/1
2 INHALANT RESPIRATORY (INHALATION) EVERY 6 HOURS PRN
Qty: 18 G | Refills: 2 | Status: SHIPPED | OUTPATIENT
Start: 2024-11-04

## 2024-11-04 RX ORDER — BUDESONIDE AND FORMOTEROL FUMARATE DIHYDRATE 160; 4.5 UG/1; UG/1
2 AEROSOL RESPIRATORY (INHALATION)
Qty: 10.2 G | Refills: 5 | Status: SHIPPED | OUTPATIENT
Start: 2024-11-04

## 2024-11-04 RX ORDER — PREDNISONE 20 MG/1
20 TABLET ORAL 2 TIMES DAILY
Qty: 10 TABLET | Refills: 0 | Status: SHIPPED | OUTPATIENT
Start: 2024-11-04 | End: 2024-11-09

## 2024-11-04 NOTE — PROGRESS NOTES
Subjective   Patient ID: Ishmael Odell is a 34 y.o. male who presents for Med Management. Refills     HPI     Following with psych for ADHD, depression and anxiety--recently started on Prozac which has helped his mood. Has noticed ED after starting med.     Productive cough x 2 weeks. Has some SOB and wheezing. Has been using his dads Symbicort. Using Albuterol inh prn which has been helpful    Has significant family history of CAD: Dad s/p CABG at age 40, 2 uncles with CAD     Review of Systems   All other systems reviewed and are negative.      Objective   Wt 83.5 kg (184 lb)   BMI 28.82 kg/m²     Physical Exam  Constitutional:       Appearance: Normal appearance.   HENT:      Head: Normocephalic and atraumatic.   Cardiovascular:      Rate and Rhythm: Normal rate and regular rhythm.      Heart sounds: Normal heart sounds. No murmur heard.     No gallop.   Pulmonary:      Effort: Pulmonary effort is normal. No respiratory distress.      Breath sounds: No wheezing or rales.   Skin:     General: Skin is warm and dry.      Findings: No rash.   Neurological:      Mental Status: He is alert and oriented to person, place, and time. Mental status is at baseline.   Psychiatric:         Mood and Affect: Mood normal.         Behavior: Behavior normal.         Assessment/Plan       #HTN  -Likely anabolic steroid induced. When he does not take steroids BP is well controlled without olmesartan. Recommended patient not take steroids, but if he does it is recommended he take olmesartan to control BP  -Encouraged he get PSG that was ordered   -Check CBC, CMP and lipid panel     #Asthma exacerbation   -Rx Zpack and prednisone 20mg BID x 5 days   -Refill Symbicort 160/4.5mg 2 puffs BID and albuterol inh prn     #ED  -Rx Sildenafil 50mg daily prn     #Family hx of CAD  -CT cardiac score 2021-0  -Refer to cardiology     RTC 6 mo

## 2024-11-04 NOTE — PATIENT INSTRUCTIONS
Meds refilled    Please complete fasting blood work. Fast for 10 hours, black coffee and water the morning of labs are OK.     Cardiology [phone]    6 months

## 2024-11-12 NOTE — PATIENT INSTRUCTIONS
1. Reviewed diagnostic impression including subjective and objective data and provided education about anxiety disorder and ADHD, etiology, treatment recommendations including medication, therapy, course of treatment and prognosis. Patient amendable to treatment plan.     2. Recommendations - You are doing well, let me know if you experiencing new or worsening symptoms and if so, I can move up your next appointment.    CONTINUE : 20 mg Adderall IR - take one tablet, twice  daily for ADHD  START Fluoxetine 20 mg PO every day for Depression       3. Reviewed r/b/a, possible side effects of the medication(s). Client is aware benefit/risks      4. Labs reviewed and discussed - Please complete urine drug screen prior to our next appointment      5. Plan for supportive psychotherapy     6. Follow up with physical health providers as scheduled     7. Follow up in December      May follow up sooner if experiences worsening symptoms by calling  Psychiatry at [phone]     Patient verbalized an understanding to call [facility] at [phone] ([facility]), [phone], or [phone]/go to the nearest emergency room if experiences thoughts of harm to self or others.

## 2024-11-12 NOTE — PROGRESS NOTES
"Patient ID: Ishmael Odell is a 34 y.o. male with  past medical history of asthma, insomnia, ADHD and anxiety presenting to outpatient psychiatric for follow up  evaluation and treatment for   anxiety and  ADHD.       Chief Compliant - Follow up medication assessment      Patient provided 2 personal identifiers prior to virtual teleconferencing appointment    Patient describes his mood as \"good\"     He tolerated the start of Prozac and he feels that it is helping.     He denies current depression, SI, or HI. He isn't as irritable and he hasn't had excalation of symptoms including irritability, aggression, or agitation.     Patient has ongoing anxiety but no panic attacks.     Adderall remains affecting and he reports that it helps improve his attention and it prevents distractions.     Patient reports long history of insomnia but he isn't bothered by his symptoms.     No changes in appetite.     No new physical complaints, patient denies pain, no new medical diagnoses.     ADHD  This is a chronic problem. The current episode started more than 1 year ago. The problem is markedly improved.  Pertinent negatives include no arthralgias, chest pain, chills, coughing, fatigue, headaches, joint swelling, myalgias, nausea, neck pain, numbness or weakness.   Anxiety  Presents for follow-up visit. Symptoms include  excessive worry and irritability. Symptoms have improved since starting Prozac.  Patient reports no chest pain, compulsions, confusion, depressed mood, dizziness, feeling of choking, hyperventilation, insomnia, muscle tension, nausea, nervous/anxious behavior, obsessions, palpitations, panic, restlessness, shortness of breath or suicidal ideas. Symptoms occur most days. The severity of symptoms is mild. The quality of sleep is fair. Nighttime awakenings: occasional.      Compliance with medications is %.     Depression  Visit Type: follow-up  Patient presents with the following symptoms: excessive worry, " insomnia and irritability. Not experiencing decreased concentration, depressed mood,   Frequency of symptoms: most days   Severity: mild   Sleep quality: non-restorative  Nighttime awakenings: occasional  Compliance with medications:  %       Review of Systems   Constitutional:  Negative for activity change, appetite change, chills and fatigue.   HENT: Negative.     Eyes: Negative.    Respiratory:  Negative for apnea, cough, choking, chest tightness, shortness of breath and stridor.    Cardiovascular:  Negative for chest pain and palpitations.   Gastrointestinal: Negative for abdominal distention, vomiting, reflux, diarrhea and nausea.   Endocrine: Negative.    Genitourinary: Negative.    Musculoskeletal:  Negative for arthralgias, back pain, gait problem, joint swelling, myalgias, neck pain and neck stiffness.   Skin:  Negative for pallor.   Allergic/Immunologic: Negative.    Neurological:  Negative for dizziness, tremors, seizures, syncope, facial asymmetry, speech difficulty, weakness, light-headedness, numbness and headaches.   Hematological: Negative.    Psychiatric/Behavioral:  Negative for agitation, behavioral problems, confusion, dysphoric mood, hallucinations, self-injury, sleep disturbance and suicidal ideas. The patient is not nervous/anxious, does not have insomnia and is not hyperactive.      Mental Status Examination  General Appearance: Well groomed, appropriate eye contact.  Gait/Station: not assessed - virtual appointment   Speech: Normal rate, volume, prosody  Mood: good  Affect: appropriate with full-range   Thought Process: Linear, goal directed  Thought Associations: No loosening of associations  Thought Content: normal  Perception: No perceptual abnormalities noted  Level of Consciousness: Alert  Orientation: Alert and oriented to person, place, time and situation  Attention and Concentration: Intact  Recent Memory: Intact as evidenced by ability to recall details from the past 24 hours    Remote Memory: Intact as evidenced by ability to recall previous medical issues   Executive function: Intact  Language: Naming intact  Fund of knowledge: Good  Insight: Intact   Judgment: Fair, as evidenced by help-seeking behavior, ability to reason through medical decision making, and compliance with treatment recommendations    Lab Review:   Lab on 04/17/2024   Component Date Value    Amphetamine Screen, Urine 04/17/2024 Presumptive Negative     Barbiturate Screen, Urine 04/17/2024 Presumptive Negative     Benzodiazepines Screen, * 04/17/2024 Presumptive Negative     Cannabinoid Screen, Urine 04/17/2024 Presumptive Positive (A)     Cocaine Metabolite Scree* 04/17/2024 Presumptive Negative     Fentanyl Screen, Urine 04/17/2024 Presumptive Negative     Opiate Screen, Urine 04/17/2024 Presumptive Negative     Oxycodone Screen, Urine 04/17/2024 Presumptive Negative     PCP Screen, Urine 04/17/2024 Presumptive Negative     Methadone Screen, Urine 04/17/2024 Presumptive Negative     Cholesterol 04/17/2024 162     HDL-Cholesterol 04/17/2024 33.8     Cholesterol/HDL Ratio 04/17/2024 4.8     LDL Calculated 04/17/2024 116 (H)     VLDL 04/17/2024 13     Triglycerides 04/17/2024 63     Non HDL Cholesterol 04/17/2024 128     Glucose 04/17/2024 74     Sodium 04/17/2024 139     Potassium 04/17/2024 4.4     Chloride 04/17/2024 101     Bicarbonate 04/17/2024 29     Anion Gap 04/17/2024 13     Urea Nitrogen 04/17/2024 21     Creatinine 04/17/2024 1.55 (H)     eGFR 04/17/2024 61     Calcium 04/17/2024 9.4     Albumin 04/17/2024 4.7     Alkaline Phosphatase 04/17/2024 45     Total Protein 04/17/2024 6.9     AST 04/17/2024 46 (H)     Bilirubin, Total 04/17/2024 0.4     ALT 04/17/2024 42     WBC 04/17/2024 5.4     nRBC 04/17/2024 0.0     RBC 04/17/2024 6.20 (H)     Hemoglobin 04/17/2024 15.2     Hematocrit 04/17/2024 50.1     MCV 04/17/2024 81     MCH 04/17/2024 24.5 (L)     MCHC 04/17/2024 30.3 (L)     RDW 04/17/2024 17.7 (H)      Platelets 04/17/2024 312     Color, Urine 04/17/2024 Yellow     Appearance, Urine 04/17/2024 Clear     Specific Gravity, Urine 04/17/2024 1.006     pH, Urine 04/17/2024 9.0 (N)     Protein, Urine 04/17/2024 NEGATIVE     Glucose, Urine 04/17/2024 NEGATIVE     Blood, Urine 04/17/2024 NEGATIVE     Ketones, Urine 04/17/2024 NEGATIVE     Bilirubin, Urine 04/17/2024 NEGATIVE     Urobilinogen, Urine 04/17/2024 <2.0     Nitrite, Urine 04/17/2024 NEGATIVE     Leukocyte Esterase, Urine 04/17/2024 NEGATIVE     Testosterone, Free 04/17/2024 205.6 (H)     Testosterone, Total, LC-* 04/17/2024 603     Estradiol 04/17/2024 <19     IGF 1 (Insulin-Like Grow* 04/17/2024 178     IGF 1 Z Score Calculation 04/17/2024 0.5     07-Ueg-1-carboxy-THC, Ur* 04/17/2024 69        Assessment/Plan   Safety Assessment: no current or past SI, no SA, (+) guns. RF include age,male gender, anxiety . PF include connected family,, future focused, hopeful. low risk     The patient is tolerating Prozac and he appears to have had a nice response to the medication. Depression and irritability has improved. Adderall is effective for ADHD symptoms. Will follow up in December to monitor symptoms.     May need to consider Bipolar -2 Disorder differential diagnosis at follow up  Attention deficit hyperactivity disorder (ADHD), predominantly inattentive type  -     amphetamine-dextroamphetamine (AdderalL) 20 mg tablet; Take 1 tablet (20 mg) by mouth 2 times a day.  Current mild episode of major depressive disorder without prior episode (CMS-[facility])  -     FLUoxetine (PROzac) 20 mg capsule; Take 1 capsule (20 mg) by mouth once daily.    Follow up 12/7/2024     Time     Chart Review 2 minutes   Direct Patient contact 25 minutes   Charting 5 minutes     Total time 32 minutes

## 2024-12-07 ENCOUNTER — APPOINTMENT (OUTPATIENT)
Dept: BEHAVIORAL HEALTH | Facility: CLINIC | Age: 34
End: 2024-12-07
Payer: COMMERCIAL

## 2024-12-11 DIAGNOSIS — F90.0 ATTENTION DEFICIT HYPERACTIVITY DISORDER (ADHD), PREDOMINANTLY INATTENTIVE TYPE: ICD-10-CM

## 2024-12-11 RX ORDER — DEXTROAMPHETAMINE SACCHARATE, AMPHETAMINE ASPARTATE, DEXTROAMPHETAMINE SULFATE AND AMPHETAMINE SULFATE 5; 5; 5; 5 MG/1; MG/1; MG/1; MG/1
20 TABLET ORAL 2 TIMES DAILY
Qty: 60 TABLET | Refills: 0 | Status: SHIPPED | OUTPATIENT
Start: 2024-12-11 | End: 2025-12-11

## 2025-01-14 DIAGNOSIS — F90.0 ATTENTION DEFICIT HYPERACTIVITY DISORDER (ADHD), PREDOMINANTLY INATTENTIVE TYPE: ICD-10-CM

## 2025-01-14 RX ORDER — DEXTROAMPHETAMINE SACCHARATE, AMPHETAMINE ASPARTATE, DEXTROAMPHETAMINE SULFATE AND AMPHETAMINE SULFATE 5; 5; 5; 5 MG/1; MG/1; MG/1; MG/1
20 TABLET ORAL 2 TIMES DAILY
Qty: 60 TABLET | Refills: 0 | Status: SHIPPED | OUTPATIENT
Start: 2025-01-14 | End: 2026-01-14

## 2025-01-17 DIAGNOSIS — J45.40 MODERATE PERSISTENT ASTHMA WITHOUT COMPLICATION (HHS-HCC): ICD-10-CM

## 2025-01-17 RX ORDER — ALBUTEROL SULFATE 90 UG/1
2 INHALANT RESPIRATORY (INHALATION) EVERY 6 HOURS PRN
Qty: 18 G | Refills: 1 | Status: SHIPPED | OUTPATIENT
Start: 2025-01-17

## 2025-02-19 DIAGNOSIS — F90.0 ATTENTION DEFICIT HYPERACTIVITY DISORDER (ADHD), PREDOMINANTLY INATTENTIVE TYPE: ICD-10-CM

## 2025-02-19 RX ORDER — DEXTROAMPHETAMINE SACCHARATE, AMPHETAMINE ASPARTATE, DEXTROAMPHETAMINE SULFATE AND AMPHETAMINE SULFATE 5; 5; 5; 5 MG/1; MG/1; MG/1; MG/1
20 TABLET ORAL 2 TIMES DAILY
Qty: 60 TABLET | Refills: 0 | Status: SHIPPED | OUTPATIENT
Start: 2025-02-19 | End: 2026-02-19

## 2025-03-04 ENCOUNTER — LAB (OUTPATIENT)
Dept: LAB | Facility: HOSPITAL | Age: 35
End: 2025-03-04
Payer: COMMERCIAL

## 2025-03-04 DIAGNOSIS — E29.1 HYPOGONADISM IN MALE: ICD-10-CM

## 2025-03-05 DIAGNOSIS — R29.818 SUSPECTED SLEEP APNEA: Primary | ICD-10-CM

## 2025-03-05 LAB
ALBUMIN SERPL-MCNC: 4.5 G/DL (ref 3.6–5.1)
ALP SERPL-CCNC: 55 U/L (ref 36–130)
ALT SERPL-CCNC: 36 U/L (ref 9–46)
ANION GAP SERPL CALCULATED.4IONS-SCNC: 10 MMOL/L (CALC) (ref 7–17)
AST SERPL-CCNC: 22 U/L (ref 10–40)
BILIRUB SERPL-MCNC: 0.3 MG/DL (ref 0.2–1.2)
BUN SERPL-MCNC: 17 MG/DL (ref 7–25)
CALCIUM SERPL-MCNC: 9.5 MG/DL (ref 8.6–10.3)
CHLORIDE SERPL-SCNC: 104 MMOL/L (ref 98–110)
CHOLEST SERPL-MCNC: 145 MG/DL
CHOLEST/HDLC SERPL: 3.2 (CALC)
CO2 SERPL-SCNC: 28 MMOL/L (ref 20–32)
CREAT SERPL-MCNC: 1.07 MG/DL (ref 0.6–1.26)
EGFRCR SERPLBLD CKD-EPI 2021: 94 ML/MIN/1.73M2
ERYTHROCYTE [DISTWIDTH] IN BLOOD BY AUTOMATED COUNT: 12.2 % (ref 11–15)
GLUCOSE SERPL-MCNC: 96 MG/DL (ref 65–99)
HCT VFR BLD AUTO: 50.3 % (ref 38.5–50)
HDLC SERPL-MCNC: 45 MG/DL
HGB BLD-MCNC: 16 G/DL (ref 13.2–17.1)
LDLC SERPL CALC-MCNC: 68 MG/DL (CALC)
MCH RBC QN AUTO: 27.2 PG (ref 27–33)
MCHC RBC AUTO-ENTMCNC: 31.8 G/DL (ref 32–36)
MCV RBC AUTO: 85.5 FL (ref 80–100)
NONHDLC SERPL-MCNC: 100 MG/DL (CALC)
PLATELET # BLD AUTO: 246 THOUSAND/UL (ref 140–400)
PMV BLD REES-ECKER: 10.1 FL (ref 7.5–12.5)
POTASSIUM SERPL-SCNC: 4.4 MMOL/L (ref 3.5–5.3)
PROT SERPL-MCNC: 6.3 G/DL (ref 6.1–8.1)
RBC # BLD AUTO: 5.88 MILLION/UL (ref 4.2–5.8)
SODIUM SERPL-SCNC: 142 MMOL/L (ref 135–146)
TRIGL SERPL-MCNC: 219 MG/DL
WBC # BLD AUTO: 6.6 THOUSAND/UL (ref 3.8–10.8)

## 2025-03-10 DIAGNOSIS — J45.40 MODERATE PERSISTENT ASTHMA WITHOUT COMPLICATION (HHS-HCC): ICD-10-CM

## 2025-03-10 RX ORDER — ALBUTEROL SULFATE 90 UG/1
2 INHALANT RESPIRATORY (INHALATION) EVERY 6 HOURS PRN
Qty: 18 G | Refills: 1 | Status: SHIPPED | OUTPATIENT
Start: 2025-03-10

## 2025-03-12 LAB
TESTOST FREE SERPL-MCNC: 85.1 PG/ML (ref 35–155)
TESTOST SERPL-MCNC: 448 NG/DL (ref 250–1100)

## 2025-03-24 ENCOUNTER — CLINICAL SUPPORT (OUTPATIENT)
Dept: SLEEP MEDICINE | Facility: CLINIC | Age: 35
End: 2025-03-24
Payer: COMMERCIAL

## 2025-03-24 DIAGNOSIS — R29.818 SUSPECTED SLEEP APNEA: ICD-10-CM

## 2025-03-24 NOTE — PROGRESS NOTES
[employer] has called and left 2 messages, with phone number listed in [facility], to return HST device.  Once on 3/26 and once on 3/28.  Device has not been returned at this time.        Type of Study: HOME SLEEP STUDY - NOMAD     The patient received equipment and instructions for use of the [employer] Nomad HSAT device. The patient was instructed how to apply the effort belts, cannula, thermistor. It was also explained how the Nomad and oximeter components work.  The patient was asked to record their sleep for at least a 6 hour period.     The patient was informed of their responsibility for the device and acknowledged this by signing the HSAT device contract. The patient was asked to return the device on 3/25/2025 between the hours of 8am to 3pm  to the [facility] in [facility].     The patient was instructed to call 911 as usual for any medical- emergencies while at home.  The patient was also given a phone number for troubleshooting when using the device in case there were additional questions.

## 2025-04-01 DIAGNOSIS — F90.0 ATTENTION DEFICIT HYPERACTIVITY DISORDER (ADHD), PREDOMINANTLY INATTENTIVE TYPE: ICD-10-CM

## 2025-04-01 RX ORDER — DEXTROAMPHETAMINE SACCHARATE, AMPHETAMINE ASPARTATE, DEXTROAMPHETAMINE SULFATE AND AMPHETAMINE SULFATE 5; 5; 5; 5 MG/1; MG/1; MG/1; MG/1
20 TABLET ORAL 2 TIMES DAILY
Qty: 60 TABLET | Refills: 0 | Status: SHIPPED | OUTPATIENT
Start: 2025-04-01 | End: 2026-04-01

## 2025-05-17 ENCOUNTER — APPOINTMENT (OUTPATIENT)
Dept: BEHAVIORAL HEALTH | Facility: CLINIC | Age: 35
End: 2025-05-17
Payer: COMMERCIAL

## 2025-05-17 DIAGNOSIS — F90.0 ATTENTION DEFICIT HYPERACTIVITY DISORDER (ADHD), PREDOMINANTLY INATTENTIVE TYPE: ICD-10-CM

## 2025-05-17 DIAGNOSIS — Z51.81 THERAPEUTIC DRUG MONITORING: ICD-10-CM

## 2025-05-17 DIAGNOSIS — F32.0 CURRENT MILD EPISODE OF MAJOR DEPRESSIVE DISORDER WITHOUT PRIOR EPISODE: ICD-10-CM

## 2025-05-17 RX ORDER — DEXTROAMPHETAMINE SACCHARATE, AMPHETAMINE ASPARTATE MONOHYDRATE, DEXTROAMPHETAMINE SULFATE AND AMPHETAMINE SULFATE 5; 5; 5; 5 MG/1; MG/1; MG/1; MG/1
20 CAPSULE, EXTENDED RELEASE ORAL EVERY MORNING
Qty: 30 CAPSULE | Refills: 0 | Status: SHIPPED | OUTPATIENT
Start: 2025-05-17 | End: 2025-06-16

## 2025-05-17 RX ORDER — DEXTROAMPHETAMINE SACCHARATE, AMPHETAMINE ASPARTATE, DEXTROAMPHETAMINE SULFATE AND AMPHETAMINE SULFATE 5; 5; 5; 5 MG/1; MG/1; MG/1; MG/1
20 TABLET ORAL
Qty: 30 TABLET | Refills: 0 | Status: SHIPPED | OUTPATIENT
Start: 2025-05-17 | End: 2025-06-16

## 2025-05-17 NOTE — PROGRESS NOTES
"Patient ID: Ishmael Odell is a 35 y.o. male with  past medical history of asthma, insomnia, ADHD and anxiety presenting to outpatient psychiatric for follow up  evaluation and treatment for   anxiety and  ADHD.       Chief Compliant - Follow up medication assessment      Patient provided 2 personal identifiers prior to virtual teleconferencing appointment    Patient describes his mood as \"ok\" Not as irritable as he had been.     When the Adderall wears of he feels sanchez.    He stopped taking the Prozac due to sexual side effects.     Patient has ongoing anxiety but no panic attacks.     Sleep is improved. He had a sleep study and he doesn't have sleep appetite.     He takes a THC gummy which has helped him sleep.     No changes in appetite.     No new physical complaints, patient denies pain, no new medical diagnoses.     ADHD  This is a chronic problem. The current episode started more than 1 year ago. The problem is markedly improved.  Pertinent negatives include no arthralgias, chest pain, chills, coughing, fatigue, headaches, joint swelling, myalgias, nausea, neck pain, numbness or weakness.   Anxiety  Presents for follow-up visit. Symptoms include  excessive worry and irritability.   Patient reports no chest pain, compulsions, confusion, depressed mood, dizziness, feeling of choking, hyperventilation, insomnia, muscle tension, nausea, nervous/anxious behavior, obsessions, palpitations, panic, restlessness, shortness of breath or suicidal ideas. Symptoms occur most days. The severity of symptoms is mild. The quality of sleep is fair. Nighttime awakenings: occasional.      Compliance with medications is %.     Depression  Visit Type: follow-up  Patient presents with the following symptoms: excessive worry, insomnia and irritability. Not experiencing decreased concentration, depressed mood,   Frequency of symptoms: most days   Severity: mild   Sleep quality: non-restorative  Nighttime awakenings: " "occasional  Compliance with medications:  %       Review of Systems   Constitutional:  Negative for activity change, appetite change, chills and fatigue.   HENT: Negative.     Eyes: Negative.    Respiratory:  Negative for apnea, cough, choking, chest tightness, shortness of breath and stridor.    Cardiovascular:  Negative for chest pain and palpitations.   Gastrointestinal: Negative for abdominal distention, vomiting, reflux, diarrhea and nausea.   Endocrine: Negative.    Genitourinary: Negative.    Musculoskeletal:  Negative for arthralgias, back pain, gait problem, joint swelling, myalgias, neck pain and neck stiffness.   Skin:  Negative for pallor.   Allergic/Immunologic: Negative.    Neurological:  Negative for dizziness, tremors, seizures, syncope, facial asymmetry, speech difficulty, weakness, light-headedness, numbness and headaches.   Hematological: Negative.    Psychiatric/Behavioral:  Negative for agitation, behavioral problems, confusion, dysphoric mood, hallucinations, self-injury, sleep disturbance and suicidal ideas. The patient is not nervous/anxious, does not have insomnia and is not hyperactive.      Mental Status Examination  General Appearance: Well groomed, appropriate eye contact.  Gait/Station: not assessed - virtual appointment   Speech: Normal rate, volume, prosody  Mood: \"ok\"   Affect: appropriate with full-range   Thought Process: Linear, goal directed  Thought Associations: No loosening of associations  Thought Content: normal  Perception: No perceptual abnormalities noted  Level of Consciousness: Alert  Orientation: Alert and oriented to person, place, time and situation  Attention and Concentration: Intact  Recent Memory: Intact as evidenced by ability to recall details from the past 24 hours   Remote Memory: Intact as evidenced by ability to recall previous medical issues   Executive function: Intact  Language: Naming intact  Fund of knowledge: Good  Insight: Intact   Judgment: " Fair, as evidenced by help-seeking behavior, ability to reason through medical decision making, and compliance with treatment recommendations    Lab Review:   Lab on 04/17/2024   Component Date Value    Amphetamine Screen, Urine 04/17/2024 Presumptive Negative     Barbiturate Screen, Urine 04/17/2024 Presumptive Negative     Benzodiazepines Screen, * 04/17/2024 Presumptive Negative     Cannabinoid Screen, Urine 04/17/2024 Presumptive Positive (A)     Cocaine Metabolite Scree* 04/17/2024 Presumptive Negative     Fentanyl Screen, Urine 04/17/2024 Presumptive Negative     Opiate Screen, Urine 04/17/2024 Presumptive Negative     Oxycodone Screen, Urine 04/17/2024 Presumptive Negative     PCP Screen, Urine 04/17/2024 Presumptive Negative     Methadone Screen, Urine 04/17/2024 Presumptive Negative     Cholesterol 04/17/2024 162     HDL-Cholesterol 04/17/2024 33.8     Cholesterol/HDL Ratio 04/17/2024 4.8     LDL Calculated 04/17/2024 116 (H)     VLDL 04/17/2024 13     Triglycerides 04/17/2024 63     Non HDL Cholesterol 04/17/2024 128     Glucose 04/17/2024 74     Sodium 04/17/2024 139     Potassium 04/17/2024 4.4     Chloride 04/17/2024 101     Bicarbonate 04/17/2024 29     Anion Gap 04/17/2024 13     Urea Nitrogen 04/17/2024 21     Creatinine 04/17/2024 1.55 (H)     eGFR 04/17/2024 61     Calcium 04/17/2024 9.4     Albumin 04/17/2024 4.7     Alkaline Phosphatase 04/17/2024 45     Total Protein 04/17/2024 6.9     AST 04/17/2024 46 (H)     Bilirubin, Total 04/17/2024 0.4     ALT 04/17/2024 42     WBC 04/17/2024 5.4     nRBC 04/17/2024 0.0     RBC 04/17/2024 6.20 (H)     Hemoglobin 04/17/2024 15.2     Hematocrit 04/17/2024 50.1     MCV 04/17/2024 81     MCH 04/17/2024 24.5 (L)     MCHC 04/17/2024 30.3 (L)     RDW 04/17/2024 17.7 (H)     Platelets 04/17/2024 312     Color, Urine 04/17/2024 Yellow     Appearance, Urine 04/17/2024 Clear     Specific Gravity, Urine 04/17/2024 1.006     pH, Urine 04/17/2024 9.0 (N)     Protein,  Urine 04/17/2024 NEGATIVE     Glucose, Urine 04/17/2024 NEGATIVE     Blood, Urine 04/17/2024 NEGATIVE     Ketones, Urine 04/17/2024 NEGATIVE     Bilirubin, Urine 04/17/2024 NEGATIVE     Urobilinogen, Urine 04/17/2024 <2.0     Nitrite, Urine 04/17/2024 NEGATIVE     Leukocyte Esterase, Urine 04/17/2024 NEGATIVE     Testosterone, Free 04/17/2024 205.6 (H)     Testosterone, Total, LC-* 04/17/2024 603     Estradiol 04/17/2024 <19     IGF 1 (Insulin-Like Grow* 04/17/2024 178     IGF 1 Z Score Calculation 04/17/2024 0.5     58-[id number]-7-carboxy-THC, Ur* 04/17/2024 69        Assessment/Plan   Safety Assessment: no current or past SI, no SA, (+) guns. RF include age,male gender, anxiety . PF include connected family,, future focused, hopeful. low risk     The patient noticed that his Adderall was wearing off so we will add a an IR addition for the afternoon and ER dose daily. He quit taking Prozac due to side effects. He is doing OK mood wise and will not reorder     Urine drug screen ordered 5/17/2025  Attention deficit hyperactivity disorder (ADHD), predominantly inattentive type  -     amphetamine-dextroamphetamine (AdderalL) 20 mg tablet; Take 1 tablet (20 mg) by mouth 1 times a day in Adderall    Adderall ER 20 mg -1 tablet daily in the am   Current mild episode of major depressive disorder without prior episode (CMS-HCC)  - DC     FLUoxetine (PROzac) 20 mg capsule; Take 1 capsule (20 mg) by mouth once daily.    Follow up in 3 months. Patient will call to make an appointment.     Time     Chart Review 2 minutes   Direct Patient contact 25 minutes   Charting 5 minutes     Total time 32 minutes

## 2025-05-17 NOTE — PATIENT INSTRUCTIONS
1. Reviewed diagnostic impression including subjective and objective data and provided education about anxiety disorder and ADHD, etiology, treatment recommendations including medication, therapy, course of treatment and prognosis. Patient amendable to treatment plan.         2. Recommendations - Change  : 20 mg Adderall IR - take one tablet in the afternoon daily for ADHD and 1 20 mg Adderall ER dose in the am. We can discontinue the Prozac     START 20 mg Adderall ER give 1 tablet daily in the am for ADHD   START 20 mg Adderall IR  1 tablet in the afternoon for ADHD  DC  Fluoxetine 20 mg PO every day for Depression       3. Reviewed r/b/a, possible side effects of the medication(s). Client is aware benefit/risks      4. Labs reviewed and discussed - I place order for urine drug screen to complete prior to your next visit 5/17/2025    5. Plan for supportive psychotherapy     6. Follow up with physical health providers as scheduled     7. Follow up in 3 months  - Call to make an appointment      May follow up sooner if experiences worsening symptoms by calling  Psychiatry at [phone]     Patient verbalized an understanding to call Mobile Crisis at [phone] ([facility]), [phone], or [phone]/go to the nearest emergency room if experiences thoughts of harm to self or others.

## 2025-05-30 DIAGNOSIS — J45.40 MODERATE PERSISTENT ASTHMA WITHOUT COMPLICATION (HHS-HCC): ICD-10-CM

## 2025-05-30 RX ORDER — ALBUTEROL SULFATE 90 UG/1
2 INHALANT RESPIRATORY (INHALATION) EVERY 6 HOURS PRN
Qty: 18 G | Refills: 1 | Status: SHIPPED | OUTPATIENT
Start: 2025-05-30